# Patient Record
Sex: FEMALE | Race: WHITE | NOT HISPANIC OR LATINO | Employment: UNEMPLOYED | URBAN - METROPOLITAN AREA
[De-identification: names, ages, dates, MRNs, and addresses within clinical notes are randomized per-mention and may not be internally consistent; named-entity substitution may affect disease eponyms.]

---

## 2017-07-18 ENCOUNTER — PRE VISIT (OUTPATIENT)
Dept: ORTHOPEDICS | Facility: CLINIC | Age: 57
End: 2017-07-18

## 2017-07-18 NOTE — TELEPHONE ENCOUNTER
Pt returned my call, She has xrays that we need to gather.  Emailed TETO form to pt ([email or web address])

## 2017-07-18 NOTE — TELEPHONE ENCOUNTER
1.  Date/reason for appt: 7/25/17 3:20PM Scoliosis, Per pt, Xrays   2.  Referring provider: Self   3.  Call to patient (Yes / No - short description): Yes, called and LM for pt to return my call regarding outside recs. -1st attempt   4.  Previous care at / records requested from:  [facility] - Who?

## 2017-07-24 ENCOUNTER — TRANSFERRED RECORDS (OUTPATIENT)
Dept: HEALTH INFORMATION MANAGEMENT | Facility: CLINIC | Age: 57
End: 2017-07-24

## 2017-07-24 NOTE — TELEPHONE ENCOUNTER
Called and spoke with pt to inform her we do not have her recs from [facility]. Pt understood and will call the Clinic to see if she can expedite her recs to be faxed. 2nd attempt

## 2017-07-24 NOTE — TELEPHONE ENCOUNTER
Called [facility], spoke with Sandhya who mentioned that she is waiting for the MD to review the recs and will fax over recs once approved by the MD.

## 2017-07-24 NOTE — TELEPHONE ENCOUNTER
[facility], They will be faxing recs today & pt will be hand carrying her CD (Xrays) to the appt.

## 2017-07-25 ENCOUNTER — OFFICE VISIT (OUTPATIENT)
Dept: ORTHOPEDICS | Facility: CLINIC | Age: 57
End: 2017-07-25

## 2017-07-25 VITALS — BODY MASS INDEX: 26.13 KG/M2 | HEIGHT: 62 IN | WEIGHT: 142 LBS

## 2017-07-25 DIAGNOSIS — M41.25 OTHER IDIOPATHIC SCOLIOSIS, THORACOLUMBAR REGION: Primary | ICD-10-CM

## 2017-07-25 DIAGNOSIS — M62.830 PARASPINAL MUSCLE SPASM: ICD-10-CM

## 2017-07-25 ASSESSMENT — ENCOUNTER SYMPTOMS
NECK PAIN: 1
BACK PAIN: 1
ARTHRALGIAS: 0
MUSCLE WEAKNESS: 0
MYALGIAS: 0
MUSCLE CRAMPS: 0
JOINT SWELLING: 0
STIFFNESS: 1

## 2017-07-25 NOTE — MR AVS SNAPSHOT
After Visit Summary   7/25/2017    Latricia Epperson    MRN: [id number]           Patient Information     Date Of Birth          1960        Visit Information        Provider Department      7/25/2017 3:20 PM Ja Kinney MD [facility]        Today's Diagnoses     Other idiopathic scoliosis, thoracolumbar region    -  1       Follow-ups after your visit        Additional Services     PHYSICAL THERAPY REFERRAL (External-Prints)       Physical Therapy Referral            SPINE SURGERY REFERRAL       Dr Caraballo or Jayleen                  Your next 10 appointments already scheduled     Nov 09, 2017  1:00 PM CST   (Arrive by 12:30 PM)   NEW SPINE with Prateek Clemens MD   [facility])    [address] [address] [phone]              Who to contact     Please call your clinic at [phone] to:    Ask questions about your health    Make or cancel appointments    Discuss your medicines    Learn about your test results    Speak to your doctor   If you have compliments or concerns about an experience at your clinic, or if you wish to file a complaint, please contact [facility] Patient Relations at [phone] or email us at [email or web address]         Additional Information About Your Visit        MyChart Information     [employer] is an electronic gateway that provides easy, online access to your medical records. With [employer], you can request a clinic appointment, read your test results, renew a prescription or communicate with your care team.     To sign up for [employer] visit the website at [email or web address]   You will be asked to enter the access code listed below, as well as some personal information. Please follow the directions to create your username and password.     Your access code is: [email or web address]  Expires: 10/9/2017  6:31  "AM     Your access code will  in 90 days. If you need help or a new code, please contact your [facility] or call [phone] for assistance.        Care EveryWhere ID     This is your Care EveryWhere ID. This could be used by other organizations to access your [place] medical records  [id number]        Your Vitals Were     Height BMI (Body Mass Index)                1.575 m (5' 2\") 25.97 kg/m2           Blood Pressure from Last 3 Encounters:   16 107/73    Weight from Last 3 Encounters:   17 64.4 kg (142 lb)   16 62.1 kg (137 lb)              We Performed the Following     PHYSICAL THERAPY REFERRAL (External-Prints)     SPINE SURGERY REFERRAL        Primary Care Provider Office Phone # Fax #    Mya MAN Mynor, MAURICIO [phone]       PRIMARY CARE CENTER [address] [place] [address] [phone]  [facility] [address]        Equal Access to Services     LESLIE DUNCAN : Hadii aad ku hadasho Soomaali, waaxda luqadaha, qaybta kaalmada adeegyada, waxay idiin hayminorn chelsey barger . So [facility] [phone].    ATENCIÓN: Si habla español, tiene a klein disposición servicios gratuitos de asistencia lingüística. Llame al [phone].    We comply with applicable federal civil rights laws and [place] laws. We do not discriminate on the basis of race, color, national origin, age, disability sex, sexual orientation or gender identity.            Thank you!     Thank you for choosing [facility]  for your care. Our goal is always to provide you with excellent care. Hearing back from our patients is one way we can continue to improve our services. Please take a few minutes to complete the written survey that you may receive in the mail after your visit with us. Thank you!             Your Updated Medication List - Protect others around you: Learn how to safely use, store and throw away your medicines at www.disposemymeds.org.          This list is " accurate as of: 7/25/17  4:56 PM.  Always use your most recent med list.                   Brand Name Dispense Instructions for use Diagnosis    MULTI-VITAMIN DAILY PO           OMEGA-3 FISH OIL PO

## 2017-07-25 NOTE — NURSING NOTE
"Reason For Visit:   Chief Complaint   Patient presents with     Musculoskeletal Problem     Scoliosis, previously treated [facility]     Age: 57 year old    Occupation: Computer work/sitting/phone.  Currently working? Yes.    Date of injury: gradual increase in symptoms over last 5 years    Date of surgery: No surgical history to back    Smoker: Yes - very intermittent    Pain Assessment  Patient Currently in Pain: Yes  Primary Pain Location: Back  Pain Descriptors: Pressure, Dull  Aggravating Factors: Standing, Walking for too long    Ht 1.575 m (5' 2\")  Wt 64.4 kg (142 lb)  BMI 25.97 kg/m2                                                   "

## 2017-07-25 NOTE — LETTER
"7/25/2017       RE: Latricia Epperson  [id number] [address] [address]     Dear Colleague,    Thank you for referring your patient, Latricia Epperson, to the [facility] at [facility]. Please see a copy of my visit note below.    HISTORY OF PRESENT ILLNESS  Ms. Epperson is a pleasant 57 year old year old female who presents to clinic today with history of scoliosis  Latricia explains that she 'decided a long time ago not to have surgery for her back and it has started to become more prominent in the 'curve'  Location: thoracic and lumbar spine  Quality:  achy pain    Severity: 2/10 at worst    Duration: many years  No radiation of pain into legs or buttocks, has paraspinal muscle spasms at times.    Additional history: as documented    MEDICAL HISTORY  Patient Active Problem List   Diagnosis     History of colonic polyps, due for colonoscopy      Current Outpatient Prescriptions   Medication Sig Dispense Refill     Omega-3 Fatty Acids (OMEGA-3 FISH OIL PO)        Multiple Vitamin (MULTI-VITAMIN DAILY PO)      No Known Allergies    Family History   Problem Relation Age of Onset     Depression Mother      Hyperlipidemia Brother      Thyroid Disease Sister      Depression Sister      OSTEOPOROSIS Paternal Grandmother      Coronary Artery Disease Paternal Grandfather      Cirrhosis Father      Osteoarthritis       Bipolar Disorder Mother      Additional medical/Social/Surgical histories reviewed in [facility] and updated as appropriate.     REVIEW OF SYSTEMS (7/25/2017)  10 point ROS of systems including Constitutional, Eyes, Respiratory, Cardiovascular, Gastroenterology, Genitourinary, Integumentary, Musculoskeletal, Psychiatric were all negative except for pertinent positives noted in my HPI.     PHYSICAL EXAM  Vitals:    07/25/17 1545   Weight: 64.4 kg (142 lb)   Height: 1.575 m (5' 2\")     Vital Signs: Ht 1.575 m (5' 2\")  Wt 64.4 kg (142 lb)  BMI 25.97 kg/m2 Patient declined " being weighed. Body mass index is 25.97 kg/(m^2).    General  - normal appearance, in no obvious distress  CV  - normal peripheral perfusion  Pulm  - normal respiratory pattern, non-labored  Musculoskeletal - thoraco/lumbar spine  - stance: normal gait without limp, no obvious leg length discrepancy, normal heel and toe walk  - inspection: abnormal bone and joint alignment, has obvious scoliosis- thoracolumbar  - palpation: no paravertebral or bony tenderness  - ROM: flexion exacerbates mild pain in thoracic spine, normal extension, sidebending, rotation  - strength: lower extremities 5/5 in all planes  - special tests:  (-) straight leg raise  (-) slump test  Neuro  - patellar and Achilles DTRs 2+ bilaterally, NO lower extremity sensory deficit throughout L5 distribution, grossly normal coordination, normal muscle tone  Skin  - no ecchymosis, erythema, warmth, or induration, no obvious rash  Psych  - interactive, appropriate, normal mood and affect    ASSESSMENT & PLAN  58 yo female with history of scoliosis, stable  -discussed HEP for exercises and utility of physical therapy  She would like to have a conversation with Dr Caraballo about possible surgical correction of her spine.   She doesn't like to curve and 'thought that she would always have to live with it'  We discussed her spine xrays and that currently she is functional, but her back 'bothers her daily'  F/u after Dr Caraballo as needed    Ja Kinney MD, CAM

## 2017-07-25 NOTE — TELEPHONE ENCOUNTER
Records received from [facility].   Included  Office notes: 2014  Other: neurospinal function index reports

## 2017-07-25 NOTE — PROGRESS NOTES
"HISTORY OF PRESENT ILLNESS  Ms. Epperson is a pleasant 57 year old year old female who presents to clinic today with history of scoliosis  Latricia explains that she 'decided a long time ago not to have surgery for her back and it has started to become more prominent in the 'curve'  Location: thoracic and lumbar spine  Quality:  achy pain    Severity: 2/10 at worst    Duration: many years  No radiation of pain into legs or buttocks, has paraspinal muscle spasms at times.    Additional history: as documented    MEDICAL HISTORY  Patient Active Problem List   Diagnosis     History of colonic polyps, due for colonoscopy        Current Outpatient Prescriptions   Medication Sig Dispense Refill     Omega-3 Fatty Acids (OMEGA-3 FISH OIL PO)        Multiple Vitamin (MULTI-VITAMIN DAILY PO)          No Known Allergies    Family History   Problem Relation Age of Onset     Depression Mother      Hyperlipidemia Brother      Thyroid Disease Sister      Depression Sister      OSTEOPOROSIS Paternal Grandmother      Coronary Artery Disease Paternal Grandfather      Cirrhosis Father      Osteoarthritis       Bipolar Disorder Mother        Additional medical/Social/Surgical histories reviewed in [facility] and updated as appropriate.     REVIEW OF SYSTEMS (7/25/2017)  10 point ROS of systems including Constitutional, Eyes, Respiratory, Cardiovascular, Gastroenterology, Genitourinary, Integumentary, Musculoskeletal, Psychiatric were all negative except for pertinent positives noted in my HPI.     PHYSICAL EXAM  Vitals:    07/25/17 1545   Weight: 64.4 kg (142 lb)   Height: 1.575 m (5' 2\")     Vital Signs: Ht 1.575 m (5' 2\")  Wt 64.4 kg (142 lb)  BMI 25.97 kg/m2 Patient declined being weighed. Body mass index is 25.97 kg/(m^2).    General  - normal appearance, in no obvious distress  CV  - normal peripheral perfusion  Pulm  - normal respiratory pattern, non-labored  Musculoskeletal - thoraco/lumbar spine  - stance: normal gait without limp, no " obvious leg length discrepancy, normal heel and toe walk  - inspection: abnormal bone and joint alignment, has obvious scoliosis- thoracolumbar  - palpation: no paravertebral or bony tenderness  - ROM: flexion exacerbates mild pain in thoracic spine, normal extension, sidebending, rotation  - strength: lower extremities 5/5 in all planes  - special tests:  (-) straight leg raise  (-) slump test  Neuro  - patellar and Achilles DTRs 2+ bilaterally, NO lower extremity sensory deficit throughout L5 distribution, grossly normal coordination, normal muscle tone  Skin  - no ecchymosis, erythema, warmth, or induration, no obvious rash  Psych  - interactive, appropriate, normal mood and affect    ASSESSMENT & PLAN  58 yo female with history of scoliosis, stable  -discussed HEP for exercises and utility of physical therapy  She would like to have a conversation with Dr Caraballo about possible surgical correction of her spine.   She doesn't like to curve and 'thought that she would always have to live with it'  We discussed her spine xrays and that currently she is functional, but her back 'bothers her daily'  F/u after Dr Caraballo as needed    Ja Kinney MD, CAQSM    Answers for HPI/ROS submitted by the patient on 7/25/2017   General Symptoms: No  Skin Symptoms: No  HENT Symptoms: No  EYE SYMPTOMS: No  HEART SYMPTOMS: No  LUNG SYMPTOMS: No  INTESTINAL SYMPTOMS: No  URINARY SYMPTOMS: No  GYNECOLOGIC SYMPTOMS: No  BREAST SYMPTOMS: No  SKELETAL SYMPTOMS: Yes  BLOOD SYMPTOMS: No  NERVOUS SYSTEM SYMPTOMS: No  MENTAL HEALTH SYMPTOMS: No  Back pain: Yes  Muscle aches: No  Neck pain: Yes  Swollen joints: No  Joint pain: No  Bone pain: Yes  Muscle cramps: No  Muscle weakness: No  Joint stiffness: Yes  Bone fracture: No

## 2017-10-13 ENCOUNTER — TRANSFERRED RECORDS (OUTPATIENT)
Dept: HEALTH INFORMATION MANAGEMENT | Facility: CLINIC | Age: 57
End: 2017-10-13

## 2017-11-14 ENCOUNTER — PRE VISIT (OUTPATIENT)
Dept: ORTHOPEDICS | Facility: CLINIC | Age: 57
End: 2017-11-14

## 2017-11-14 NOTE — TELEPHONE ENCOUNTER
APPT INFO    Date /Time: 11/30/17- 9:30 AM   Reason for Appt: Scoliosis    Ref Provider/Clinic: Dr. Kinney    Are there internal records? If yes, list: [facility]    -XR Scoliosis 7/25/17    Patient Contact (Y/N) & Call Details: No - Pt is referred. Previous Chiropractic records were already forwarded to Ortho clinic for Dr. Kinney appointment. (See Previsit on 7/18/17.)    Action: Closing encounter.

## 2017-11-28 DIAGNOSIS — M41.9 SCOLIOSIS: Primary | ICD-10-CM

## 2017-11-30 ENCOUNTER — OFFICE VISIT (OUTPATIENT)
Dept: ORTHOPEDICS | Facility: CLINIC | Age: 57
End: 2017-11-30

## 2017-11-30 VITALS — HEIGHT: 62 IN | BODY MASS INDEX: 25.62 KG/M2 | WEIGHT: 139.2 LBS

## 2017-11-30 DIAGNOSIS — M41.125 ADOLESCENT IDIOPATHIC SCOLIOSIS OF THORACOLUMBAR REGION: Primary | ICD-10-CM

## 2017-11-30 RX ORDER — CHOLECALCIFEROL (VITAMIN D3) 25 MCG
1 CAPSULE ORAL EVERY OTHER DAY
COMMUNITY
End: 2022-01-12

## 2017-11-30 NOTE — PROGRESS NOTES
Spine Surgery Consultation    REFERRING PHYSICIAN: Ja Kinney   PRIMARY CARE PHYSICIAN: Mya Lowe           Chief Complaint:   Eval/Assessment (scoliosis, low to mid back pain)      History of Present Illness:  Symptom Profile Including: location of symptoms, onset, severity, exacerbating/alleviating factors, previous treatments:        Latricia Epperson is a 57 year old female with a history of scoliosis treated with observation without pain or discomfort until about 2000 following birth of her 3rd child and has noticed increased discomfort in the past 3 years. She has been sitting more throughout the day at her work which she attributes to the increase in pain. She has been going to PT for the past month or two and has noticed improvement in pain and discomfort. She also walks a lot which has now begun to bother her this past year.     SRS: 66%  VAS 2/10 back, 0/10 leg  PROMIS  -mental 16  -physical 13         Past Medical History:     Past Medical History:   Diagnosis Date     Anemia      Fibroids      Radon exposure      Scoliosis      Scoliosis of thoracic spine, unspecified scoliosis             Past Surgical History:     Past Surgical History:   Procedure Laterality Date     HYSTERECTOMY  2007    fibroids     TONSILLECTOMY              Social History:     Social History   Substance Use Topics     Smoking status: Current Some Day Smoker     Smokeless tobacco: Never Used      Comment: occ smoker     Alcohol use Not on file      Comment: 3 drinks a month            Family History:     Family History   Problem Relation Age of Onset     Depression Mother      Hyperlipidemia Brother      Thyroid Disease Sister      Depression Sister      OSTEOPOROSIS Paternal Grandmother      Coronary Artery Disease Paternal Grandfather      Cirrhosis Father      Osteoarthritis       Bipolar Disorder Mother             Allergies:   No Known Allergies         Medications:     Current Outpatient Prescriptions  "  Medication     TURMERIC CURCUMIN PO     Cholecalciferol (VITAMIN D-3) 1000 UNITS CAPS     Calcium-Vitamin D-Vitamin K (CALCIUM + D + K PO)     Omega-3 Fatty Acids (OMEGA-3 FISH OIL PO)     Multiple Vitamin (MULTI-VITAMIN DAILY PO)     No current facility-administered medications for this visit.              Review of Systems:     A 10 point ROS was performed and reviewed. Specific responses to these questions are noted at the end of the document.         Physical Exam:   Vitals: Ht 1.575 m (5' 2.01\")  Wt 63.1 kg (139 lb 3.2 oz)  BMI 25.45 kg/m2  Constitutional: awake, alert, cooperative, no apparent distress, appears stated age.    Eyes: The sclera are white.  Ears, Nose, Throat: The trachea is midline.  Psychiatric: The patient has a normal affect.  Respiratory: breathing non-labored  Cardiovascular: The extremities are warm and perfused.  Skin: no obvious rashes or lesions.  Musculoskeletal, Neurologic, and Spine:   Non-antalgic gait without use of assistive devices.      Thoracic Spine:    Appearance - scoliosis    Palpation - non tender    Strength/ROM - full         Lumbar Spine:    Appearance - scoliosis    Palpation - non tender    ROM - full    Motor -     L2-3: Hip flexion L and R 5/5 strength          L4:  Knee extension L and R 5/5 strength         L5:  Foot / EHL dorsiflexion L and R 5/5 strength         S1:  Plantarflexion  L and R 5/5 strength    Sensation: intact to light touch L3-S1 distribution BLE      Special tests -     Straight leg raise - Negative     ENRRIQUE - Negative     Pain with hip ROM - Negative     Facet loading - Negative           Imaging:   We ordered and independently reviewed new radiographs at this clinic visit. The results were discussed with the patient.  Findings include:    T9-L4 60 degrees convex left    Pelvic Incidence: 63  Pelvic Tilt: 53  Lumbar Lordosis (M0e-P3r): 48  PI-LL mismatch: 15  K7e-L9t: 42  ThoracoLumbar (V49e-H8k): 23  Thoracic (H8u-F82m): 14             " Assessment and Plan:   Assessment:  57 year old female with history of thoracolumbar scoliosis diagnosed in her teens and treated conservatively with increasing lumbar discomfort over the past several years, controlled with conservative measures. Curious about surgical intervention, associated risks and any other treatment modalities.      Plan:  We had a long discussion today with the patient regarding the possible surgical interventions for her scoliosis. We also discussed the risks benefits alternatives and recovery. At this time patient would like to continue conservative therapies. RTC ~2 years for repeat xrays and assessment of any curve progression.      JOSEPH Falk MD  PGY-4 Orthopaedic Surgery    Seen and discussed with Dr. Caraballo who is in agreement with the above assessment and plan      Total Time = >25 min, 50% of which was spent in counseling and coordination of care as documented above.    I saw and evaluated the patient on the day of the visit and I formulated the plan.  Ja Caraballo MD      Answers for HPI/ROS submitted by the patient on 11/30/2017   General Symptoms: No  Skin Symptoms: No  HENT Symptoms: No  EYE SYMPTOMS: No  HEART SYMPTOMS: No  LUNG SYMPTOMS: No  INTESTINAL SYMPTOMS: No  URINARY SYMPTOMS: No  GYNECOLOGIC SYMPTOMS: No  BREAST SYMPTOMS: No  SKELETAL SYMPTOMS: No  BLOOD SYMPTOMS: No  NERVOUS SYSTEM SYMPTOMS: No  MENTAL HEALTH SYMPTOMS: No

## 2017-11-30 NOTE — MR AVS SNAPSHOT
After Visit Summary   11/30/2017    Latricia Epperson    MRN: [id number]           Patient Information     Date Of Birth          1960        Visit Information        Provider Department      11/30/2017 9:30 AM Ja Caraballo MD [facility]        Today's Diagnoses     Adolescent idiopathic scoliosis of thoracolumbar region    -  1       Follow-ups after your visit        Additional Services     ORTHOTICS REFERRAL (external)       Orthotics: Spinal Brace:  [facility] Brace.   Pt. Will arrange to have this done in [facility] through [facility] in [facility]#[phone] fax# [phone]      Please be aware that coverage of these services is subject to the terms and limitations of your health insurance plan.  Call member services at your health plan with any benefit or coverage questions.      Please bring the following to your appointment:    >>   Any x-rays, CTs or MRIs which have been performed.  Contact the facility where they were done to arrange for  prior to your scheduled appointment.    >>   List of current medications   >>   This referral request   >>   Any documents/labs given to you for this referral                  Who to contact     Please call your clinic at [phone] to:    Ask questions about your health    Make or cancel appointments    Discuss your medicines    Learn about your test results    Speak to your doctor   If you have compliments or concerns about an experience at your clinic, or if you wish to file a complaint, please contact [facility] Patient Relations at [phone] or email us at [email or web address]         Additional Information About Your Visit        MyChart Information     [employer] gives you secure access to your electronic health record. If you see a primary care provider, you can also send messages to your care team and make appointments. If you have questions, please call your primary  "care clinic.  If you do not have a primary care provider, please call [phone] and they will assist you.      Bring Light is an electronic gateway that provides easy, online access to your medical records. With Bring Light, you can request a clinic appointment, read your test results, renew a prescription or communicate with your care team.     To access your existing account, please contact your [facility] or call [phone] for assistance.        Care EveryWhere ID     This is your Care EveryWhere ID. This could be used by other organizations to access your [facility] medical records  [id number]        Your Vitals Were     Height BMI (Body Mass Index)                1.575 m (5' 2.01\") 25.45 kg/m2           Blood Pressure from Last 3 Encounters:   No data found for BP    Weight from Last 3 Encounters:   No data found for Wt              Today, you had the following     No orders found for display       Primary Care Provider Office Phone # Fax #    Mya Lowe, APRN -[phone]       [address] [place] [address]  [facility] [address]        Equal Access to Services     [facility] : Hadii aad ku hadasho Soomaali, waaxda luqadaha, qaybta kaalmada adeegyada, octavia barger . So [facility] [phone].    ATENCIÓN: Si habla español, tiene a klein disposición servicios gratuitos de asistencia lingüística. [facility] [phone].    We comply with applicable federal civil rights laws and [place] laws. We do not discriminate on the basis of race, color, national origin, age, disability, sex, sexual orientation, or gender identity.            Thank you!     Thank you for choosing [facility]  for your care. Our goal is always to provide you with excellent care. Hearing back from our patients is one way we can continue to improve our services. Please take a few minutes to complete the written survey that you may receive in the mail after " your visit with us. Thank you!             Your Updated Medication List - Protect others around you: Learn how to safely use, store and throw away your medicines at www.disposemymeds.org.          This list is accurate as of: 11/30/17 11:59 PM.  Always use your most recent med list.                   Brand Name Dispense Instructions for use Diagnosis    CALCIUM + D + K PO      Take 1 tablet by mouth daily        MULTI-VITAMIN DAILY PO           OMEGA-3 FISH OIL PO           TURMERIC CURCUMIN PO      Take 1 tablet by mouth every other day        Vitamin D-3 1000 UNITS Caps      Take 1 tablet by mouth every other day

## 2017-11-30 NOTE — LETTER
11/30/2017       RE: Latricia Epperson  [id number] [facility] [address]     Dear Colleague,    Thank you for referring your patient, Latricia Epperson, to the [facility] at [facility]. Please see a copy of my visit note below.    Spine Surgery Consultation    REFERRING PHYSICIAN: Ja Kinney   PRIMARY CARE PHYSICIAN: Mya Lowe           Chief Complaint:   Eval/Assessment (scoliosis, low to mid back pain)      History of Present Illness:  Symptom Profile Including: location of symptoms, onset, severity, exacerbating/alleviating factors, previous treatments:        Latricia Epperson is a 57 year old female with a history of scoliosis treated with observation without pain or discomfort until about 2000 following birth of her 3rd child and has noticed increased discomfort in the past 3 years. She has been sitting more throughout the day at her work which she attributes to the increase in pain. She has been going to PT for the past month or two and has noticed improvement in pain and discomfort. She also walks a lot which has now begun to bother her this past year.     SRS: 66%  VAS 2/10 back, 0/10 leg  PROMIS  -mental 16  -physical 13         Past Medical History:     Past Medical History:   Diagnosis Date     Anemia      Fibroids      Radon exposure      Scoliosis      Scoliosis of thoracic spine, unspecified scoliosis             Past Surgical History:     Past Surgical History:   Procedure Laterality Date     HYSTERECTOMY  2007    fibroids     TONSILLECTOMY              Social History:     Social History   Substance Use Topics     Smoking status: Current Some Day Smoker     Smokeless tobacco: Never Used      Comment: occ smoker     Alcohol use Not on file      Comment: 3 drinks a month            Family History:     Family History   Problem Relation Age of Onset     Depression Mother      Hyperlipidemia Brother      Thyroid Disease Sister      Depression  "Sister      OSTEOPOROSIS Paternal Grandmother      Coronary Artery Disease Paternal Grandfather      Cirrhosis Father      Osteoarthritis       Bipolar Disorder Mother             Allergies:   No Known Allergies         Medications:     Current Outpatient Prescriptions   Medication     TURMERIC CURCUMIN PO     Cholecalciferol (VITAMIN D-3) 1000 UNITS CAPS     Calcium-Vitamin D-Vitamin K (CALCIUM + D + K PO)     Omega-3 Fatty Acids (OMEGA-3 FISH OIL PO)     Multiple Vitamin (MULTI-VITAMIN DAILY PO)     No current facility-administered medications for this visit.              Review of Systems:     A 10 point ROS was performed and reviewed. Specific responses to these questions are noted at the end of the document.         Physical Exam:   Vitals: Ht 1.575 m (5' 2.01\")  Wt 63.1 kg (139 lb 3.2 oz)  BMI 25.45 kg/m2  Constitutional: awake, alert, cooperative, no apparent distress, appears stated age.    Eyes: The sclera are white.  Ears, Nose, Throat: The trachea is midline.  Psychiatric: The patient has a normal affect.  Respiratory: breathing non-labored  Cardiovascular: The extremities are warm and perfused.  Skin: no obvious rashes or lesions.  Musculoskeletal, Neurologic, and Spine:   Non-antalgic gait without use of assistive devices.      Thoracic Spine:    Appearance - scoliosis    Palpation - non tender    Strength/ROM - full         Lumbar Spine:    Appearance - scoliosis    Palpation - non tender    ROM - full    Motor -     L2-3: Hip flexion L and R 5/5 strength          L4:  Knee extension L and R 5/5 strength         L5:  Foot / EHL dorsiflexion L and R 5/5 strength         S1:  Plantarflexion  L and R 5/5 strength    Sensation: intact to light touch L3-S1 distribution BLE      Special tests -     Straight leg raise - Negative     ENRRIQUE - Negative     Pain with hip ROM - Negative     Facet loading - Negative           Imaging:   We ordered and independently reviewed new radiographs at this clinic visit. " The results were discussed with the patient.  Findings include:    T9-L4 60 degrees convex left    Pelvic Incidence: 63  Pelvic Tilt: 53  Lumbar Lordosis (L5v-J6x): 48  PI-LL mismatch: 15  C8k-O9y: 42  ThoracoLumbar (G88v-Q7m): 23  Thoracic (A5j-W64n): 14             Assessment and Plan:   Assessment:  57 year old female with history of thoracolumbar scoliosis diagnosed in her teens and treated conservatively with increasing lumbar discomfort over the past several years, controlled with conservative measures. Curious about surgical intervention, associated risks and any other treatment modalities.      Plan:  We had a long discussion today with the patient regarding the possible surgical interventions for her scoliosis. We also discussed the risks benefits alternatives and recovery. At this time patient would like to continue conservative therapies. RTC ~2 years for repeat xrays and assessment of any curve progression.      JOSEPH Falk MD  PGY-4 Orthopaedic Surgery    Seen and discussed with Dr. Caraballo who is in agreement with the above assessment and plan      Total Time = >25 min, 50% of which was spent in counseling and coordination of care as documented above.      Answers for HPI/ROS submitted by the patient on 11/30/2017   General Symptoms: No  Skin Symptoms: No  HENT Symptoms: No  EYE SYMPTOMS: No  HEART SYMPTOMS: No  LUNG SYMPTOMS: No  INTESTINAL SYMPTOMS: No  URINARY SYMPTOMS: No  GYNECOLOGIC SYMPTOMS: No  BREAST SYMPTOMS: No  SKELETAL SYMPTOMS: No  BLOOD SYMPTOMS: No  NERVOUS SYSTEM SYMPTOMS: No  MENTAL HEALTH SYMPTOMS: No      Again, thank you for allowing me to participate in the care of your patient.      Sincerely,    Ja Caraballo MD

## 2017-11-30 NOTE — NURSING NOTE
"Reason For Visit:   Chief Complaint   Patient presents with     Eval/Assessment     scoliosis, low to mid back pain                  Primary MD: Mya Lowe  Ref. MD: Dr. Kinney      Occupation .  Currently working? Yes.  Work status?  Full time.  Date of injury: none    Date of surgery: none    Smoker: Yes, socially, not every day      Ht 1.575 m (5' 2.01\")  Wt 63.1 kg (139 lb 3.2 oz)  BMI 25.45 kg/m2    Pain Assessment  Patient Currently in Pain: Yes  0-10 Pain Scale:  (1 - 7)  Primary Pain Location: Back  Pain Orientation: Lower, Mid  Pain Descriptors: Dull  Alleviating Factors: Other (comment) (repositioning)  Aggravating Factors: Other (comment) (any prolonged position)        Visual Analog Pain Scale  Back Pain Scale 0-10: 2  Right leg pain: 0  Left leg pain: 0    Promis 10 Assessment    PROMIS 10 11/30/2017   In general, would you say your health is: Very good   In general, would you say your quality of life is: Very good   In general, how would you rate your physical health? Good   In general, how would you rate your mental health, including your mood and your ability to think? Very good   In general, how would you rate your satisfaction with your social activities and relationships? Very good   In general, please rate how well you carry out your usual social activities and roles Good   To what extent are you able to carry out your everyday physical activities such as walking, climbing stairs, carrying groceries, or moving a chair? Moderately   How often have you been bothered by emotional problems such as feeling anxious, depressed or irritable? Rarely   How would you rate your fatigue on average? Mild   How would you rate your pain on average?   0 = No Pain  to  10 = Worst Imaginable Pain 4   In general, would you say your health is: 4   In general, would you say your quality of life is: 4   In general, how would you rate your physical health? 3   In general, how would you rate " your mental health, including your mood and your ability to think? 4   In general, how would you rate your satisfaction with your social activities and relationships? 4   In general, please rate how well you carry out your usual social activities and roles. (This includes activities at home, at work and in your community, and responsibilities as a parent, child, spouse, employee, friend, etc.) 3   To what extent are you able to carry out your everyday physical activities such as walking, climbing stairs, carrying groceries, or moving a chair? 3   In the past 7 days, how often have you been bothered by emotional problems such as feeling anxious, depressed, or irritable? 2   In the past 7 days, how would you rate your fatigue on average? 2   In the past 7 days, how would you rate your pain on average, where 0 means no pain, and 10 means worst imaginable pain? 4   Global Mental Health Score 16   Global Physical Health Score 13   PROMIS TOTAL - SUBSCORES 29                Sarahy Ochoa LPN

## 2017-12-31 ENCOUNTER — HEALTH MAINTENANCE LETTER (OUTPATIENT)
Age: 57
End: 2017-12-31

## 2018-10-02 ENCOUNTER — MYC MEDICAL ADVICE (OUTPATIENT)
Dept: ORTHOPEDICS | Facility: CLINIC | Age: 58
End: 2018-10-02

## 2018-11-27 ENCOUNTER — PATIENT OUTREACH (OUTPATIENT)
Dept: CARE COORDINATION | Facility: CLINIC | Age: 58
End: 2018-11-27

## 2018-11-28 ASSESSMENT — ENCOUNTER SYMPTOMS
ARTHRALGIAS: 0
MYALGIAS: 0
BACK PAIN: 1
MUSCLE CRAMPS: 0
NECK PAIN: 0
STIFFNESS: 0
JOINT SWELLING: 0
MUSCLE WEAKNESS: 0

## 2018-11-29 ENCOUNTER — OFFICE VISIT (OUTPATIENT)
Dept: INTERNAL MEDICINE | Facility: CLINIC | Age: 58
End: 2018-11-29
Payer: COMMERCIAL

## 2018-11-29 VITALS
OXYGEN SATURATION: 100 % | BODY MASS INDEX: 24.7 KG/M2 | SYSTOLIC BLOOD PRESSURE: 114 MMHG | WEIGHT: 139.4 LBS | HEART RATE: 75 BPM | DIASTOLIC BLOOD PRESSURE: 65 MMHG | HEIGHT: 63 IN

## 2018-11-29 DIAGNOSIS — Z13.1 SCREENING FOR DIABETES MELLITUS: ICD-10-CM

## 2018-11-29 DIAGNOSIS — Z13.89 SCREENING FOR HEMATURIA OR PROTEINURIA: ICD-10-CM

## 2018-11-29 DIAGNOSIS — Z13.0 SCREENING FOR DEFICIENCY ANEMIA: ICD-10-CM

## 2018-11-29 DIAGNOSIS — Z13.220 SCREENING FOR HYPERLIPIDEMIA: ICD-10-CM

## 2018-11-29 DIAGNOSIS — R01.1 MURMUR, CARDIAC: Primary | ICD-10-CM

## 2018-11-29 DIAGNOSIS — Z12.31 ENCOUNTER FOR SCREENING MAMMOGRAM FOR BREAST CANCER: ICD-10-CM

## 2018-11-29 NOTE — PATIENT INSTRUCTIONS
Primary Care Center Phone Number: [phone]   Primary Care Center Medication Refill Request Information:  * Please contact your pharmacy regarding ANY request for medication refills.  ** [facility] Prescription Fax = [phone]  * Please allow 3 business days for routine medication refills.  * Please allow 5 business days for controlled substance medication refills.     Primary [facility] Test Result notification information:  *You will be notified with in 7-10 days of your appointment day regarding the results of your test.  If you are on MyChart you will be notified as soon as the provider has reviewed the results and signed off on them.

## 2018-11-29 NOTE — PROGRESS NOTES
[facility]  Primary Care Center   Mya Lowe, MAURICIO CNP  11/29/2018      Chief Complaint:   Preventive Physical       History of Present Illness:   Latricia Epperson is a 58 year old female with a history of scoliosis and fibroids who presents for a routine physical exam. Latricia does not need a pap smear, as she had a hysterectomy in 2007. However, she does still have ovaries, and would like a pelvic exam. She denies pelvic pain or concern about STD exposure.     Latricia's eye exams are up to date as of 3 weeks ago. She adds that she was given a new prescription, which she is in the process of getting accustomed to. Latricia's dental exams are also up to date.     Latricia is due for a routine mammogram and some routine bloodwork.      She had a colonoscopy 2 years ago and had 2 pre-cancerous polyps resected.  Due for colonoscopy next year.   Hx hysterectomy: hx of past anemia  due to uterine fibroids and menorrhagia.    Other concerns discussed:at [facility]   Latricia reports that she has been following with a PT  every 6 months in [facility],who was trained in the [employer] Method.  Latricia reviews her home exercise routine with her PT and has a re-evalution and completes additional exercises at home. She adds that she is confident in her PT's abilities and recommends this provider to others as well.     Latricia reports that she follows with a dermatologist and is concerned that she may be at risk for skin cancer.     Latricia declines the flu vaccine today.      Review of Systems:   Pertinent items are noted in HPI, remainder of complete ROS is negative.      Active Medications:   Current Outpatient Prescriptions:      Calcium-Vitamin D-Vitamin K (CALCIUM + D + K PO), Take 1 tablet by mouth daily, Disp: , Rfl:      Cholecalciferol (VITAMIN D-3) 1000 UNITS CAPS, Take 1 tablet by mouth every other day, Disp: , Rfl:      Multiple Vitamin (MULTI-VITAMIN DAILY PO), , Disp: , Rfl:      Omega-3 Fatty Acids (OMEGA-3  "FISH OIL PO), , Disp: , Rfl:      TURMERIC CURCUMIN PO, Take 1 tablet by mouth every other day, Disp: , Rfl:       Allergies:   Review of patient's allergies indicates no known allergies.      Past Medical History:  Anemia  Scoliosis of thoracic spine, unspecified scoliosis  Colonic polyps  Fibroids  Radon exposure     Past Surgical History:  Hysterectomy, 2007  Tonsillectomy    Family History:   Mother: Depression, bipolar disorder, rheumatoid arthritis, fibroids  Father: Cirrhosis, osteoarthritis  Sister: Thyroid disease, depression  Brother: Hyperlipidemia  Paternal grandmother: Osteoporosis  Paternal grandfather: Coronary artery disease  Maternal aunts (x4): Rheumatoid arthritis, fibroids     Social History:   Marital Status:   Presents to the clinic alone  Tobacco Use: Current some day smoker, never used smokeless tobacco  Alcohol Use: \"3 drinks a month\"  PCP: Mya Lowe     Physical Exam:   /65  Pulse 75  Ht 1.588 m (5' 2.5\")  Wt 63.2 kg (139 lb 6.4 oz)  SpO2 100%  BMI 25.09 kg/m2   Wt Readings from Last 1 Encounters:   11/29/18 63.2 kg (139 lb 6.4 oz)     Constitutional: no distress, comfortable, pleasant   Eyes: anicteric,conjunctiva pink, PERRLA  Ears, Nose and Throat: tympanic membranes clear,  throat clear.   Neck: supple with full range of motion, no thyromegaly.   Cardiovascular: regular rate and rhythm, third heart sound thought to represent a summation gallop heard at the apex. Peripheral pulses full and symmetric.   Respiratory: clear to auscultation, no wheezes or crackles, normal breath sounds   Gastrointestinal: positive bowel sounds, nontender, no hepatosplenomegaly, no masses   Gentiourinary/pelvic: normal external genitalia,  no enlargement of the [place] or [place] glands, urethra normal, perineum normal and free of lesions, adnexa not palpable.No cervix identified.   Musculoskeletal: full range of motion, no edema. S shaped scoliosis curvature of her thoracic-lumbar " spine.   Skin: no concerning lesions, no jaundice, temp normal   Breast: no lumps, no nipple discharge  Neurological:  normal gait,normal speech, no tremor   Psychological: appropriate mood   LYMPH: no axillary, cervical,  supraclavicular, infraclavicular or inguinal nodes      Assessment and Plan:  Murmur, cardiac  Murmur detected during physical exam today. Latricia will complete an echocardiogram on the same day as her routine mammogram.   - Echocardiogram    Encounter for screening mammogram for breast cancer  - Mammogram, routine screening    Screening for deficiency anemia  - CBC with platelets    Screening for hematuria or proteinuria  - UA with Micro reflex to Culture    Screening for hyperlipidemia  - Lipid panel reflex to direct LDL Fasting    Screening for diabetes mellitus  - Comprehensive metabolic panel         Scribe Disclosure:   I, Naomie Dorman, am serving as a scribe to document services personally performed by MAURICIO Mascorro CNP at this visit, based upon the provider's statements to me. All documentation has been reviewed by the aforementioned provider prior to being entered into the official medical record.     Portions of this medical record were completed by a scribe. UPON MY REVIEW AND AUTHENTICATION BY ELECTRONIC SIGNATURE, this confirms (a) I performed the applicable clinical services, and (b) the record is accurate.   Total time spent 40 minutes.  More than 50% of the time spent with Ms. Epperson on counseling / coordinating her care.    Mya MARTÍNEZ CNP

## 2018-11-29 NOTE — NURSING NOTE
Chief Complaint   Patient presents with     Physical     Pt is here for annual physical and pap        Alysa Chakraborty, Karina EMT at 1:51 PM on 11/29/2018

## 2018-11-29 NOTE — MR AVS SNAPSHOT
After Visit Summary   11/29/2018    Latricia Epperson    MRN: [id number]           Patient Information     Date Of Birth          1960        Visit Information        Provider Department      11/29/2018 1:45 PM Mya Lowe APRN [facility]        Today's Diagnoses     Murmur, cardiac    -  1    Encounter for screening mammogram for breast cancer        Screening for deficiency anemia        Screening for hematuria or proteinuria        Screening for hyperlipidemia        Screening for diabetes mellitus          Care Instructions    Primary Care Center Phone Number: [phone]   Primary Care Center Medication Refill Request Information:  * Please contact your pharmacy regarding ANY request for medication refills.  ** [facility] Prescription Fax = [phone]  * Please allow 3 business days for routine medication refills.  * Please allow 5 business days for controlled substance medication refills.     Primary Care Center Test Result notification information:  *You will be notified with in 7-10 days of your appointment day regarding the results of your test.  If you are on MyChart you will be notified as soon as the provider has reviewed the results and signed off on them.                Follow-ups after your visit        Your next 10 appointments already scheduled     Dec 04, 2018 12:00 PM CST   LAB with [facility] ([facility])    [address] [place] [address] [phone]           Please do not eat 10-12 hours before your appointment if you are coming in fasting for labs on lipids, cholesterol, or glucose (sugar). This does not apply to pregnant women. Water, hot tea and black coffee (with nothing added) are okay. Do not drink other fluids, diet soda or chew gum.            Dec 04, 2018  1:00 PM CST   MA SCREENING DIGITAL BILATERAL with [id number]   [facility] ([facility])    [address]  [facility] [address] [phone]           How do I prepare for my exam? (Food and drink instructions) No Food and Drink Restrictions.  How do I prepare for my exam? (Other instructions) Do not use any powder, lotion or deodorant under your arms or on your breast. If you do, we will ask you to remove it before your exam.  What should I wear: Wear comfortable, two-piece clothing.  How long does the exam take: Most scans will take 15 minutes.  What should I bring: Bring any previous mammograms from other facilities or have them mailed to the breast center.  Do I need a :  No  is needed.  What do I need to tell my doctor: If you have any allergies, tell your care team.  What should I do after the exam: No restrictions, You may resume normal activities.  What is this test: This test is an x-ray of the breast to look for breast disease. The breast is pressed between two plates to flatten and spread the tissue. An X-ray is taken of the breast from different angles.  Who should I call with questions: If you have any questions, please call the Imaging Department where you will have your exam. Directions, parking instructions, and other information is available on our website, [email or web address].  Other information about my exam Three-dimensional (3D) mammograms are available at [facility] locations in [facility], [facility], [facility], [place], [facility] and [place].  Health locations include [place] and the [facility] in [place].  Benefits of 3D mammograms include * Improved rate of cancer detection * Decreases your chance of having to go back for more tests, which means fewer: * \"False-positive\" results (This means that there is an abnormal area but it isn't cancer.) * Invasive testing procedures, such as a biopsy or surgery * Can provide clearer images of the breast if you have dense breast tissue.  *3D " "mammography is an optional exam that anyone can have with a 2D mammogram. It doesn't replace or take the place of a 2D mammogram. 2D mammograms remain an effective screening test for all women.  Not all insurance companies cover the cost of a 3D mammogram. Check with your insurance. Three-dimensional (3D) mammograms are available at [facility] locations in [facility], [place], [place], [facility], [place], [place], and [place]. [facility] locations include [facility] in [place]. Benefits of 3D mammograms include: - Improved rate of cancer detection - Decreases your chance of having to go back for more tests, which means fewer: - \"False-positive\" results (This means that there is an abnormal area but it isn't cancer.) - Invasive testing procedures, such as a biopsy or surgery - Can provide clearer images of the breast if you have dense breast tissue. 3D mammography is an optional exam that anyone can have with a 2D mammogram. It doesn't replace or take the place of a 2D mammogram. 2D mammograms remain an effective screening test for all women.  Not all insurance companies cover the cost of a 3D mammogram. Check with your insurance.            Dec 04, 2018  1:30 PM CST   Ech Complete with [address] [place] ([facility])    [address] [place] [address] [phone]           1.  Please bring or wear a comfortable two-piece outfit. 2.  You may eat, drink and take your normal medicines. 3.  For any questions that cannot be answered, please contact the ordering physician 4.  Please do not wear perfumes or scented lotions on the day of your exam.              Future tests that were ordered for you today     Open Future Orders        Priority Expected Expires Ordered    Lipid panel reflex to direct LDL Fasting Routine 11/29/2018 12/13/2018 11/29/2018    CBC with platelets Routine 11/29/2018 12/13/2018 11/29/2018    " "Comprehensive metabolic panel Routine 11/29/2018 12/13/2018 11/29/2018    UA with Micro reflex to Culture Routine 11/29/2018 11/29/2019 11/29/2018    Echocardiogram Routine  11/29/2019 11/29/2018    Mammogram, routine screening Routine  11/29/2019 11/29/2018            Who to contact     Please call your clinic at [phone] to:    Ask questions about your health    Make or cancel appointments    Discuss your medicines    Learn about your test results    Speak to your doctor            Additional Information About Your Visit        Tapestry Information     Tapestry gives you secure access to your electronic health record. If you see a primary care provider, you can also send messages to your care team and make appointments. If you have questions, please call your primary care clinic.  If you do not have a primary care provider, please call [phone] and they will assist you.      Tapestry is an electronic gateway that provides easy, online access to your medical records. With Tapestry, you can request a clinic appointment, read your test results, renew a prescription or communicate with your care team.     To access your existing account, please contact your [facility] or call [phone] for assistance.        Care EveryWhere ID     This is your Care EveryWhere ID. This could be used by other organizations to access your [facility] medical records  [id number]        Your Vitals Were     Pulse Height Pulse Oximetry BMI (Body Mass Index)          75 1.588 m (5' 2.5\") 100% 25.09 kg/m2         Blood Pressure from Last 3 Encounters:   11/29/18 114/65   03/31/16 107/73    Weight from Last 3 Encounters:   11/29/18 63.2 kg (139 lb 6.4 oz)   11/30/17 63.1 kg (139 lb 3.2 oz)   07/25/17 64.4 kg (142 lb)               Primary Care Provider Office Phone # Fax #    MAURICIO Cao [phone]       [address] [place] [address] [address]  [facility] [address]        Equal Access to " Services     [facility]: Hadii aad ku haddonaldzoey Sullivan, waremigioda luqadaha, qaybta kayenisean zimmer, octavia thornton. So [facility] [phone].    ATENCIÓN: Si habla español, tiene a klein disposición servicios gratuitos de asistencia lingüística. Llame al [phone].    We comply with applicable federal civil rights laws and [place] laws. We do not discriminate on the basis of race, color, national origin, age, disability, sex, sexual orientation, or gender identity.            Thank you!     Thank you for choosing [facility]  for your care. Our goal is always to provide you with excellent care. Hearing back from our patients is one way we can continue to improve our services. Please take a few minutes to complete the written survey that you may receive in the mail after your visit with us. Thank you!             Your Updated Medication List - Protect others around you: Learn how to safely use, store and throw away your medicines at www.disposemymeds.org.          This list is accurate as of 11/29/18  2:49 PM.  Always use your most recent med list.                   Brand Name Dispense Instructions for use Diagnosis    CALCIUM + D + K PO      Take 1 tablet by mouth daily        MULTI-VITAMIN DAILY PO           OMEGA-3 FISH OIL PO           TURMERIC CURCUMIN PO      Take 1 tablet by mouth every other day        Vitamin D-3 1000 units Caps      Take 1 tablet by mouth every other day

## 2018-12-04 ENCOUNTER — RADIANT APPOINTMENT (OUTPATIENT)
Dept: MAMMOGRAPHY | Facility: CLINIC | Age: 58
End: 2018-12-04
Attending: NURSE PRACTITIONER
Payer: COMMERCIAL

## 2018-12-04 ENCOUNTER — RADIANT APPOINTMENT (OUTPATIENT)
Dept: CARDIOLOGY | Facility: CLINIC | Age: 58
End: 2018-12-04
Attending: NURSE PRACTITIONER
Payer: COMMERCIAL

## 2018-12-04 DIAGNOSIS — Z13.89 SCREENING FOR HEMATURIA OR PROTEINURIA: ICD-10-CM

## 2018-12-04 DIAGNOSIS — Z13.1 SCREENING FOR DIABETES MELLITUS: ICD-10-CM

## 2018-12-04 DIAGNOSIS — R01.1 MURMUR, CARDIAC: ICD-10-CM

## 2018-12-04 DIAGNOSIS — Z13.0 SCREENING FOR DEFICIENCY ANEMIA: ICD-10-CM

## 2018-12-04 DIAGNOSIS — Z13.220 SCREENING FOR HYPERLIPIDEMIA: ICD-10-CM

## 2018-12-04 LAB
ALBUMIN SERPL-MCNC: 4 G/DL (ref 3.4–5)
ALBUMIN UR-MCNC: NEGATIVE MG/DL
ALP SERPL-CCNC: 93 U/L (ref 40–150)
ALT SERPL W P-5'-P-CCNC: 30 U/L (ref 0–50)
ANION GAP SERPL CALCULATED.3IONS-SCNC: 6 MMOL/L (ref 3–14)
APPEARANCE UR: CLEAR
AST SERPL W P-5'-P-CCNC: 31 U/L (ref 0–45)
BILIRUB SERPL-MCNC: 0.5 MG/DL (ref 0.2–1.3)
BILIRUB UR QL STRIP: NEGATIVE
BUN SERPL-MCNC: 14 MG/DL (ref 7–30)
CALCIUM SERPL-MCNC: 9.1 MG/DL (ref 8.5–10.1)
CHLORIDE SERPL-SCNC: 103 MMOL/L (ref 94–109)
CHOLEST SERPL-MCNC: 218 MG/DL
CO2 SERPL-SCNC: 28 MMOL/L (ref 20–32)
COLOR UR AUTO: NORMAL
CREAT SERPL-MCNC: 0.74 MG/DL (ref 0.52–1.04)
ERYTHROCYTE [DISTWIDTH] IN BLOOD BY AUTOMATED COUNT: 12.3 % (ref 10–15)
GFR SERPL CREATININE-BSD FRML MDRD: 82 ML/MIN/1.7M2
GLUCOSE SERPL-MCNC: 83 MG/DL (ref 70–99)
GLUCOSE UR STRIP-MCNC: NEGATIVE MG/DL
HCT VFR BLD AUTO: 44 % (ref 35–47)
HDLC SERPL-MCNC: 74 MG/DL
HGB BLD-MCNC: 14.3 G/DL (ref 11.7–15.7)
HGB UR QL STRIP: NEGATIVE
KETONES UR STRIP-MCNC: NEGATIVE MG/DL
LDLC SERPL CALC-MCNC: 124 MG/DL
LEUKOCYTE ESTERASE UR QL STRIP: NEGATIVE
MCH RBC QN AUTO: 29.7 PG (ref 26.5–33)
MCHC RBC AUTO-ENTMCNC: 32.5 G/DL (ref 31.5–36.5)
MCV RBC AUTO: 91 FL (ref 78–100)
NITRATE UR QL: NEGATIVE
NONHDLC SERPL-MCNC: 144 MG/DL
PH UR STRIP: 7 PH (ref 5–7)
PLATELET # BLD AUTO: 224 10E9/L (ref 150–450)
POTASSIUM SERPL-SCNC: 4.1 MMOL/L (ref 3.4–5.3)
PROT SERPL-MCNC: 7.9 G/DL (ref 6.8–8.8)
RBC # BLD AUTO: 4.82 10E12/L (ref 3.8–5.2)
RBC #/AREA URNS AUTO: 1 /HPF (ref 0–2)
SODIUM SERPL-SCNC: 137 MMOL/L (ref 133–144)
SOURCE: NORMAL
SP GR UR STRIP: 1 (ref 1–1.03)
SQUAMOUS #/AREA URNS AUTO: <1 /HPF (ref 0–1)
TRIGL SERPL-MCNC: 99 MG/DL
UROBILINOGEN UR STRIP-MCNC: 0 MG/DL (ref 0–2)
WBC # BLD AUTO: 4.3 10E9/L (ref 4–11)
WBC #/AREA URNS AUTO: <1 /HPF (ref 0–5)

## 2018-12-06 ENCOUNTER — TRANSFERRED RECORDS (OUTPATIENT)
Dept: HEALTH INFORMATION MANAGEMENT | Facility: CLINIC | Age: 58
End: 2018-12-06

## 2019-01-15 ENCOUNTER — MYC MEDICAL ADVICE (OUTPATIENT)
Dept: INTERNAL MEDICINE | Facility: CLINIC | Age: 59
End: 2019-01-15

## 2019-01-15 DIAGNOSIS — E78.5 HYPERLIPIDEMIA: Primary | ICD-10-CM

## 2019-11-04 DIAGNOSIS — M41.9 SCOLIOSIS: Primary | ICD-10-CM

## 2019-11-05 ASSESSMENT — ENCOUNTER SYMPTOMS: BACK PAIN: 1

## 2019-11-07 ENCOUNTER — ANCILLARY PROCEDURE (OUTPATIENT)
Dept: GENERAL RADIOLOGY | Facility: CLINIC | Age: 59
End: 2019-11-07
Attending: ORTHOPAEDIC SURGERY
Payer: COMMERCIAL

## 2019-11-07 ENCOUNTER — OFFICE VISIT (OUTPATIENT)
Dept: ORTHOPEDICS | Facility: CLINIC | Age: 59
End: 2019-11-07
Payer: COMMERCIAL

## 2019-11-07 VITALS — BODY MASS INDEX: 25.03 KG/M2 | WEIGHT: 136 LBS | HEIGHT: 62 IN

## 2019-11-07 DIAGNOSIS — M41.125 ADOLESCENT IDIOPATHIC SCOLIOSIS OF THORACOLUMBAR REGION: Primary | ICD-10-CM

## 2019-11-07 ASSESSMENT — MIFFLIN-ST. JEOR: SCORE: 1157.14

## 2019-11-07 NOTE — LETTER
2019       RE: Latricia Epperson  [id number] [facility] [address]     Dear Colleague,    Thank you for referring your patient, Latricia Epperson, to the [facility] at [facility]. Please see a copy of my visit note below.    [facility]  Orthopedics  Ja Caraballo MD  2019     Name: Latricia Epperson  MRN: [id number]  Age: 59 year old  : 1960  Referring provider: Ja Kinney     Chief Complaint: Scoliosis routine follow-up    History of Present Illness:   Latricia Epperson is a 59 year old female with thoracolumbar scoliosis diagnosed in her teens and treated conservatively with increasing lumbar discomfort over the past several years, controlled with conservative measures. I last evaluated the patient on 17, at which time we had a long discussion regarding the possible surgical interventions for her scoliosis. We also discussed the risks benefits alternatives and recovery. At this time patient wanted to continue conservative therapies. Today, the patient reports that she has been doing her scoliosis specific exercises. She has been doing well over the past year, but recently she has been doing a great deal of physical activity at home because she is moving and selling her house. She also reports that she can sneeze without pain now. She notes when bending and pull, as if pulling weeds out of the ground, she experiences a snapping pain in her back. For work, the patient went on partial disability and it has been very helpful for her work life.    SUZIE: 37.38  Ytmcne70: 26   Pain scale: 5    Review of Systems:   A 10-point review of systems was obtained and is negative except for as noted in the HPI.     Physical Examination:  Deferred exam.    Imaging:  Radiographs of the spine complete scoliosis + six foot standing extremities - 3 views (2019)  Very stable appearing thoracolumbar curvature   Overall side alignment is good  71 deg  curvature    I have independently reviewed the above imaging studies; the results were discussed with the patient.     Assessment:   59 year old female with  adult scoliosis diagnosed in her teens and treated conservatively    Plan:   - Curvature magnitude is similar as before, 71 degrees today vs 75 degrees at the last visit. There are significant arthritic changes along with a scoliotic deformity. We do not expect this to change. She has limited work capability and if her pain were worse and if she desires it, she can pursue a spinal fusion for this magnitude of deformity. The fact that she is able to work is a credit to her, and we will let her work as tolerated but she cannot do any heavy work or a full-time schedule.   - Follow-up after 2 for routine evaluation; repeat XRs at this time     Scribe Disclosure:  I, Luis Baugh, am serving as a scribe to document services personally performed by Ja Caraballo MD at this visit, based upon the provider's statements to me. All documentation has been reviewed by the aforementioned provider prior to being entered into the official medical record.     She has been doing scoliosis specific exercises under the direction of the team at [facility]. She feels this has significantly helped her to feel better.    Ja Caraballo MD

## 2019-11-07 NOTE — NURSING NOTE
"Reason For Visit:   Chief Complaint   Patient presents with     RECHECK     scoliosis, thoracolumbar region, F/U 2 year       Primary MD: Mya Lowe  Ref. MD: Dr. Kinney    ?  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  Work status?  Part-time, part time disability    Date of injury: no  Type of injury: no.    Date of surgery: no  Type of surgery: no.    Smoker: No  Request smoking cessation information: No    Ht 1.578 m (5' 2.13\")   Wt 61.7 kg (136 lb)   BMI 24.77 kg/m      Pain Assessment  Patient Currently in Pain: Yes  0-10 Pain Scale: 5  Primary Pain Location: Back  Pain Descriptors: Aching  Alleviating Factors: Stretching(changng positions)  Aggravating Factors: (one position for too long)    Oswestry (SUZIE) Questionnaire    OSWESTRY DISABILITY INDEX 11/5/2019   Count 9   Sum 17   Oswestry Score (%) 37.78            Neck Disability Index (NDI) Questionnaire    No flowsheet data found.           Visual Analog Pain Scale  Back Pain Scale 0-10: 5.5  Right leg pain: 0  Left leg pain: 0  Neck Pain Scale 0-10: 0  Right arm pain: 0  Left arm pain: 0    Promis 10 Assessment    PROMIS 10 11/5/2019   In general, would you say your health is: Good   In general, would you say your quality of life is: Good   In general, how would you rate your physical health? Good   In general, how would you rate your mental health, including your mood and your ability to think? Good   In general, how would you rate your satisfaction with your social activities and relationships? Good   In general, please rate how well you carry out your usual social activities and roles Fair   To what extent are you able to carry out your everyday physical activities such as walking, climbing stairs, carrying groceries, or moving a chair? Moderately   How often have you been bothered by emotional problems such as feeling anxious, depressed or irritable? Rarely   How would you rate your fatigue on average? Mild   How would you " rate your pain on average?   0 = No Pain  to  10 = Worst Imaginable Pain 5   In general, would you say your health is: 3   In general, would you say your quality of life is: 3   In general, how would you rate your physical health? 3   In general, how would you rate your mental health, including your mood and your ability to think? 3   In general, how would you rate your satisfaction with your social activities and relationships? 3   In general, please rate how well you carry out your usual social activities and roles. (This includes activities at home, at work and in your community, and responsibilities as a parent, child, spouse, employee, friend, etc.) 2   To what extent are you able to carry out your everyday physical activities such as walking, climbing stairs, carrying groceries, or moving a chair? 3   In the past 7 days, how often have you been bothered by emotional problems such as feeling anxious, depressed, or irritable? 2   In the past 7 days, how would you rate your fatigue on average? 2   In the past 7 days, how would you rate your pain on average, where 0 means no pain, and 10 means worst imaginable pain? 5   Global Mental Health Score 13   Global Physical Health Score 13   PROMIS TOTAL - SUBSCORES 26                Traci Orozco LPN

## 2019-11-07 NOTE — PROGRESS NOTES
[facility]  Orthopedics  Ja Caraballo MD  2019     Name: Latricia Epperson  MRN: [id number]  Age: 59 year old  : 1960  Referring provider: Ja Kinney     Chief Complaint: Scoliosis routine follow-up    History of Present Illness:   Latricia Epperson is a 59 year old female with thoracolumbar scoliosis diagnosed in her teens and treated conservatively with increasing lumbar discomfort over the past several years, controlled with conservative measures. I last evaluated the patient on 17, at which time we had a long discussion regarding the possible surgical interventions for her scoliosis. We also discussed the risks benefits alternatives and recovery. At this time patient wanted to continue conservative therapies. Today, the patient reports that she has been doing her scoliosis specific exercises. She has been doing well over the past year, but recently she has been doing a great deal of physical activity at home because she is moving and selling her house. She also reports that she can sneeze without pain now. She notes when bending and pull, as if pulling weeds out of the ground, she experiences a snapping pain in her back. For work, the patient went on partial disability and it has been very helpful for her work life.    SUZIE: 37.38  [id number]: 26   Pain scale: 5    Review of Systems:   A 10-point review of systems was obtained and is negative except for as noted in the HPI.     Physical Examination:  Deferred exam.    Imaging:  Radiographs of the spine complete scoliosis + six foot standing extremities - 3 views (2019)  Very stable appearing thoracolumbar curvature   Overall side alignment is good  71 deg curvature    I have independently reviewed the above imaging studies; the results were discussed with the patient.     Assessment:   59 year old female with adult scoliosis diagnosed in her teens and treated conservatively    Plan:   - Curvature magnitude is similar as before, 71  degrees today vs 75 degrees at the last visit. There are significant arthritic changes along with a scoliotic deformity. We do not expect this to change. She has limited work capability and if her pain were worse and if she desires it, she can pursue a spinal fusion for this magnitude of deformity. The fact that she is able to work is a credit to her, and we will let her work as tolerated but she cannot do any heavy work or a full-time schedule.   - Follow-up after 2 for routine evaluation; repeat XRs at this time     Scribe Disclosure:  I, Luis Baugh, am serving as a scribe to document services personally performed by Ja Caraballo MD at this visit, based upon the provider's statements to me. All documentation has been reviewed by the aforementioned provider prior to being entered into the official medical record.     She has been doing scoliosis specific exercises under the direction of the team at [facility]. She feels this has significantly helped her to feel better.    Ja Caraballo MD

## 2019-12-05 ENCOUNTER — TRANSFERRED RECORDS (OUTPATIENT)
Dept: HEALTH INFORMATION MANAGEMENT | Facility: CLINIC | Age: 59
End: 2019-12-05

## 2020-03-10 ENCOUNTER — HEALTH MAINTENANCE LETTER (OUTPATIENT)
Age: 60
End: 2020-03-10

## 2020-04-20 ENCOUNTER — OFFICE VISIT - RIVER FALLS (OUTPATIENT)
Dept: FAMILY MEDICINE | Facility: CLINIC | Age: 60
End: 2020-04-20

## 2020-08-18 ENCOUNTER — OFFICE VISIT - RIVER FALLS (OUTPATIENT)
Dept: FAMILY MEDICINE | Facility: CLINIC | Age: 60
End: 2020-08-18
Payer: COMMERCIAL

## 2020-12-27 ENCOUNTER — HEALTH MAINTENANCE LETTER (OUTPATIENT)
Age: 60
End: 2020-12-27

## 2021-03-06 ENCOUNTER — HEALTH MAINTENANCE LETTER (OUTPATIENT)
Age: 61
End: 2021-03-06

## 2021-03-26 ENCOUNTER — MYC MEDICAL ADVICE (OUTPATIENT)
Dept: INTERNAL MEDICINE | Facility: CLINIC | Age: 61
End: 2021-03-26

## 2021-04-24 ENCOUNTER — HEALTH MAINTENANCE LETTER (OUTPATIENT)
Age: 61
End: 2021-04-24

## 2021-07-08 ENCOUNTER — OFFICE VISIT (OUTPATIENT)
Dept: INTERNAL MEDICINE | Facility: CLINIC | Age: 61
End: 2021-07-08
Payer: COMMERCIAL

## 2021-07-08 VITALS
WEIGHT: 140.7 LBS | DIASTOLIC BLOOD PRESSURE: 70 MMHG | HEART RATE: 63 BPM | BODY MASS INDEX: 25.63 KG/M2 | OXYGEN SATURATION: 97 % | SYSTOLIC BLOOD PRESSURE: 107 MMHG

## 2021-07-08 DIAGNOSIS — Z13.220 SCREENING FOR HYPERLIPIDEMIA: Primary | ICD-10-CM

## 2021-07-08 DIAGNOSIS — M41.35 THORACOGENIC SCOLIOSIS OF THORACOLUMBAR REGION: ICD-10-CM

## 2021-07-08 PROCEDURE — 99396 PREV VISIT EST AGE 40-64: CPT | Performed by: NURSE PRACTITIONER

## 2021-07-08 NOTE — PROGRESS NOTES
S: Latricia Epperson is a 60 year old female with a history of scoliosis and fibroids who presents for a routine physical exam. Latricia does not need a pap smear, as she had a hysterectomy in 2007. However, she does still have ovaries, and would like a pelvic exam. She denies pelvic pain or concern about STD exposure.    Her last mammo was 12/4/18.  She stopped smoking 2 weeks ago.   Colonoscopy 8/18/20. Recommended f/u 10 years.     She feels well.  She continues to do scoliosis home exercises under the instruction of Virginia Booker P.T. at [facility] in [facility].  She typically does her exercises three times a week.  She is also followed by Dr. Caraballo here in Orthopedics. She cannot work full time due to her severity of scoliosis.    She has had a hysterectomy but still has ovaries.     She is otherwise feeling well with no new complaints.       Patient Active Problem List   Diagnosis     History of colonic polyps, due for colonoscopy      Last colonoscopy 11/27/18 with recommendation to f/u in 5 years to to tubular adenomas.       Past Medical History:   Diagnosis Date     Anemia      Fibroids      Radon exposure      Scoliosis of thoracic spine, unspecified scoliosis        Past Surgical History:   Procedure Laterality Date     HYSTERECTOMY  2007    fibroids     TONSILLECTOMY              Social History     Tobacco Use     Smoking status: Current Some Day Smoker     Smokeless tobacco: Never Used     Tobacco comment: occ smoker   Substance Use Topics     Alcohol use: Not on file     Comment: 3 drinks a month            Family History   Problem Relation Age of Onset     Cirrhosis Father      Heart Disease Father      Depression Mother      Bipolar Disorder Mother      Rheumatoid Arthritis Mother      Hyperlipidemia Brother      Thyroid Disease Sister      Depression Sister      Osteoporosis Paternal Grandmother      Coronary Artery Disease Paternal Grandmother      Coronary Artery Disease  "Paternal Grandfather      Osteoarthritis Other      Rashes/Skin Problems Maternal Aunt      Rheumatoid Arthritis Maternal Aunt      Cirrhosis Maternal Aunt      Rheumatoid Arthritis Maternal Aunt      Rheumatoid Arthritis Maternal Aunt      Depression Maternal Aunt      Rheumatoid Arthritis Maternal Aunt      Depression Maternal Aunt      Rheumatoid Arthritis Maternal Aunt              No Known Allergies         Current Outpatient Medications   Medication Sig Dispense Refill     Calcium-Vitamin D-Vitamin K (CALCIUM + D + K PO) Take 1 tablet by mouth daily       Cholecalciferol (VITAMIN D-3) 1000 UNITS CAPS Take 1 tablet by mouth every other day       Multiple Vitamin (MULTI-VITAMIN DAILY PO)        Omega-3 Fatty Acids (OMEGA-3 FISH OIL PO)        TURMERIC CURCUMIN PO Take 1 tablet by mouth every other day          REVIEW OF SYSTEMS:    Ears/Nose/Throat: negative    Dental exam: negative    Eyes: negative.  Normal.    Respiratory: negative    Cardiovascular: negative    Gastrointestinal: negative    Genitourinary: negative    Musculoskeletal: she has back pain if she over exerts.      Neurologic: negative     Skin: negative     Psychiatric: negative     Hematologic/Lymphatic/Immunologic:  negative      Endocrine:  negative     O:   Vital signs:                      Weight: 63.8 kg (140 lb 11.2 oz)  Estimated body mass index is 25.63 kg/m  as calculated from the following:    Height as of 11/7/19: 1.578 m (5' 2.13\").    Weight as of this encounter: 63.8 kg (140 lb 11.2 oz).      /70   Pulse 63   Wt 63.8 kg (140 lb 11.2 oz)   SpO2 97%   Breastfeeding No   BMI 25.63 kg/m      GENERAL APPEARANCE: healthy, alert and no distress  EYES: EOMI,  PERRL  HENT: ear canals and TM's normal and mouth without ulcers or lesions  RESP: lungs clear to auscultation - no rales, rhonchi or wheezes  CV: regular rates and rhythm, normal S1 S2, no S3 or S4 and no murmur, click or rub   ABDOMEN:  soft, nontender, no HSM or masses " and bowel sounds normal  NEURO:normal.  MUSK:   SKIN: normal  LYMPH:neg cervical, axillary or pelvic nodes.   EXT: warm.  Edema NO   PSYCHE: normal.     A/P:  Latricia was seen today for physical.    Diagnoses and all orders for this visit:    Screening for hyperlipidemia  -     Lipid panel reflex to direct LDL Fasting; Future  -     Basic metabolic panel  (Ca, Cl, CO2, Creat, Gluc, K, Na, BUN); Future  -     CBC with platelets; Future  -     TSH; Future           Thoracogenic scoliosis of thoracolumbar region      Screening for breast Cancer  -     MA Diagnostic Digital Bilateral    Total time spent today with this patient including chart review, exam time with patient and documentation : 60 minutes.      Mya MARTÍNEZ, CNP

## 2021-07-08 NOTE — PATIENT INSTRUCTIONS
Patient Education     Understanding Your Cholesterol Numbers     Blood flows easily when arteries are clear.      Less blood flows when cholesterol builds up in artery walls.   The higher your blood cholesterol, the greater your risk for heart attack, cardiovascular disease, or stroke. High cholesterol can cause any artery in your body to become narrow. That s why you need to know your cholesterol level. If it s high, you can take steps to bring it down. Eating the right foods and getting enough exercise can help. Some people also need medicine to control their cholesterol. Your healthcare provider can help you identify your personal risk through screenings tests and a discussion about different factors that many increase your chance for heart disease and stroke. These include family history, age, gender, ethnicity, and current health. Your healthcare provider can help you get started on a plan to control your cholesterol.  Checking your cholesterol  Your cholesterol is checked with a simple blood test. The results tell you how much cholesterol you have in your blood. Get checked as often as your healthcare provider suggests. Start monitoring your cholesterol levels regularly after age 20. Check it earlier if you have an increased risk for either high cholesterol or heart disease. If you have diabetes or high cholesterol, you may need your blood tested as often as every 3 months. As you work to lower your cholesterol, your numbers will change slowly. But they will change. Be patient and stay on track. Discuss your numbers with your healthcare provider.   Your total cholesterol number  A blood test will give you a number for the total amount of cholesterol in your blood. The higher this number, the more likely it is that cholesterol will build up in your blood vessels. Even if your cholesterol is just slightly high, you are at increased risk for health problems.  My total cholesterol is: ________________   Your  "lipid numbers  Total cholesterol is just one part of the story. Cholesterol is made up of different kinds of fats (lipids). If your total cholesterol is high, knowing your lipid profile is important. The 2 most important lipids are HDL and LDL. Your healthcare provider will tell you if you should fast before having your lipids checked. Fasting means you don t eat for a certain amount of time before the test is done. Along with cholesterol, triglyceride can also lead to blocked arteries. Triglycerides are another type of fat. Knowing your HDL, LDL, and triglyceride numbers, as well as your total cholesterol gives you a more complete picture of your cholesterol level:    HDL is called the  good  cholesterol. It moves the \"bad\" cholesterol out of the bloodstream and does not block your blood vessels. HDL levels are affected by how much you exercise and what you eat. This is the type of cholesterol that you don't want too little of. The higher the HDL, the better.My HDL cholesterol is:  ________________    LDL is called the  bad  cholesterol. This is because it can stick to your artery walls and block blood flow. LDL levels are most affected by what you eat and by your genes. LDL cholesterol is clustered with other cholesterol types including apolipoprotein B (apoB) and lipoprotein a [Lp(a)]. Higher levels of these cholesterol types can increase your risk for atherosclerosis.My LDL cholesterol is:  ________________    Triglyceride is a type of fat the body uses to store energy. Too much triglyceride can increase your risk for heart disease. Triglyceride levels should be under 150. If your triglyceride level is above 200 mg/dL your provider may want to look at another cholesterol type called apolipoprotein B (apoB).My triglyceride is:  ________________  High cholesterol is only one of the big risk factor for heart disease heart attack, stroke, and peripheral artery disease. If your cholesterol levels are higher than " normal, your healthcare provider will help you with steps to take to lower your levels. Steps may include lifestyle changes such as diet, physical activity, and quitting smoking. Your provider may also prescribe medicine to lower bad cholesterol levels. Based on your other health issues and risk factors, your healthcare provider may have specific goals for treating your cholesterol. You may need to use different kinds of medicines that work on the different types of cholesterol.  Some people may need to take medicines called statins to control their cholesterol. This is in addition to eating a healthy diet and getting regular exercise. Statins can help you stay healthy. They can also help prevent a heart attack or stroke. Also ask your provider about any side effects your medicines may cause. Let your provider know about any side effects you have. Make a plan to have regular cholesterol checks.  Work with your provider to know and understand what your cholesterol numbers mean, what your risk factors are and what are your treatment options and goals. Make sure you understand why these goals are important, based on your own health history and your family history of heart disease or high cholesterol. Stick with your treatment plan to reach the goals you set.  [employer] last reviewed this educational content on 7/1/2019 2000-2021 [employer]. All rights reserved. This information is not intended as a substitute for professional medical care. Always follow your healthcare professional's instructions.           Patient Education     What Is Osteoporosis?  Osteoporosis is a disease that weakens the bones. Weakened bones are more likely to break (fracture). Osteoporosis affects both men and women. But postmenopausal women are most at risk. To help prevent osteoporosis, you need to exercise and nourish your bones throughout your life.     Childhood  The body builds the most bone during these years. That's why  boys and girls need foods rich in calcium. They also need plenty of exercise. A healthy diet and exercise helps bones grow strong.   Young adulthood to age 30  During young adulthood, bones become their strongest. This is called peak bone mass. The same good habits that kept bones healthy in childhood help keep bones healthy in adulthood.   Age 30 to menopause  Bone mass declines slightly during these years. Your body makes just enough new bone to maintain peak bone mass. To keep your bones at their peak mass, be sure to exercise and get plenty of calcium.   After menopause  Menopause is when a woman stops having monthly periods. After menopause, the body makes less estrogen (female hormone). This increases bone loss. At this point, treatment may be needed to reduce the risk for fracture. Exercise and calcium can also help keep your bones strong.   Later in life  In later years, both men and women need to take extra care of their bones. By this point, the body loses more bone than it makes. If too much bone is lost, you may be at increased risk for fractures. With age, the quality and quantity of bone declines. You can lessen bone loss by staying active and increasing your calcium intake. Calcium supplements and other osteoporosis treatments do have risks. So talk with your healthcare provider if you have concerns. If you have osteoporosis, you can also learn ways to increase everyday safety.   [employer] last reviewed this educational content on 3/1/2020    0850-9914 [employer]. All rights reserved. This information is not intended as a substitute for professional medical care. Always follow your healthcare professional's instructions.           Patient Education     Vitamin D  Does this test have other names?  25-hydroxyvitamin D (25-high-DROX-ee-VIE-tuh-min D), 25(OH)D  What is this test?  Vitamin D is mainly found in fortified dairy foods, juice, breakfast cereal, and certain fish. This vitamin  plays many roles in the body. But because it helps the body absorb calcium from foods and supplements, it's particularly important for bone health. Vitamin D has many additional roles in the body.   Vitamin D comes in several forms. When ultraviolet light, such as sunlight, hits your skin, it creates vitamin D3. D2 is used to fortify dairy foods. Both of these are further processed by your liver and kidneys into a form your body can use. Most tests for vitamin D check the level of a form circulating in the body called 25-hydroxyvitamin D, also called 25(OH)D.    Why do I need this test?  You may need this test if your healthcare provider wants to check your vitamin D levels to find out if you have any risks to bone health. These might be:     Low calcium    Soft bones caused by low vitamin D or problems using it (osteomalacia)    Osteopenia    Osteoporosis    Rickets, in children  You may also need this test if you are at risk for low vitamin D levels. Risks include:    Being an older adult    Having difficulty absorbing fat from your diet    Having chronic kidney disease    Have dark skin pigmentation    Being a  baby  Vitamin D has many effects in the body. You may need this test to help your healthcare provider diagnose or treat:     Problems with the parathyroid gland    Cancer    Autoimmune diseases, such as multiple sclerosis and inflammatory bowel disease    Psoriasis    Asthma    Weakness or falls    What other tests might I have along with this test?  A healthcare provider may also want to check your parathyroid hormone levels and your calcium levels.    What do my test results mean?  Test results may vary depending on your age, gender, health history, the method used for the test, and other things. Your test results may not mean you have a problem. Ask your healthcare provider what your test results mean for you.    Although exact levels have not been identified, experts believe that levels of 20  nanograms per milliliter (ng/mL) is enough for good bone and overall health. . Recommended daily amounts range from 400 to 800 international units (IU) per day based on your age.   Levels lower than normal can mean you are:    Not making enough vitamin D on your own    Not getting enough vitamin D in your diet    Not absorbing vitamin D from your food as you should  Lower levels may also mean that your body is not converting the vitamin as it should. This might be because of kidney or liver disease.   Above-normal levels may be a sign that you're taking too much in supplement form.   How is this test done?  The test is done with a blood sample. A needle is used to draw blood from a vein in your arm or hand.    Does this test pose any risks?  Having a blood test with a needle carries some risks. These include bleeding, infection, bruising, and feeling lightheaded. When the needle pricks your arm or hand, you may feel a slight sting or pain. Afterward, the site may be sore.    What might affect my test results?  Your age, the amount of time you spend in the sunlight, your diet, and whether you take vitamin D in supplement form can affect your vitamin D levels. Ask your healthcare provider if any health conditions you have or medicines you take could affect your results.   How do I get ready for this test?  Tell your healthcare provider if you take vitamin D supplements. Be sure your healthcare provider knows about all medicines, herbs, vitamins, and supplements you are taking. This includes medicines that don't need a prescription and any illegal drugs you may use.    [email or web address] last reviewed this educational content on 10/1/2020    7403-9080 [employer]. All rights reserved. This information is not intended as a substitute for professional medical care. Always follow your healthcare professional's instructions.           Patient Education     Preventing Osteoporosis: Avoiding Bone Loss  Certain factors  can speed up bone loss or decrease bone growth. For example, alcohol, cigarettes, and certain medicines reduce bone mass. Some foods make it hard for your body to absorb calcium.    Things to avoid  Here are things to avoid to help prevent osteoporosis:    Alcohol. This is toxic to bones. It is a major cause of bone loss. Heavy drinking can cause osteoporosis even if you have no other risk factors.    Smoking. This reduces bone mass. Smoking may also interfere with estrogen levels and cause early menopause.    Inactivity. Not being active makes your bones lose strength and become thinner. Over time, thin bones may break. Women who aren't active are at a high risk for osteoporosis.    Certain medicines. Some medicines, such as cortisone, increase bone loss. They also decrease bone growth. Ask your healthcare provider about any side effects of your medicines, and how to prevent them.    Protein-rich or salty foods. Eaten in large amounts, these foods may deplete calcium.    Caffeine. This increases calcium loss. People who drink a lot of coffee, tea, or soda lose more calcium than those who don't.  [employer] last reviewed this educational content on 5/1/2018 2000-2021 [employer]. All rights reserved. This information is not intended as a substitute for professional medical care. Always follow your healthcare professional's instructions.

## 2021-07-08 NOTE — NURSING NOTE
Chief Complaint   Patient presents with     Physical     annual physical       PARVEZ Diego at 2:17 PM on 7/8/2021

## 2021-07-09 ENCOUNTER — TELEPHONE (OUTPATIENT)
Dept: INTERNAL MEDICINE | Facility: CLINIC | Age: 61
End: 2021-07-09

## 2021-07-09 DIAGNOSIS — Z12.31 ENCOUNTER FOR SCREENING MAMMOGRAM FOR BREAST CANCER: Primary | ICD-10-CM

## 2021-07-09 NOTE — TELEPHONE ENCOUNTER
[facility] Call Center    Phone Message    May a detailed message be left on voicemail: yes     Reason for Call: Order(s): Other:   Reason for requested: Mammogram order is for diagnostic. Patient was asked if she when scheduling if she is having issues. Patient advised no. Imaging scheduling requested patient call her PCP to change order to screening mammogram. Please follow up with patient when this has been changed so she can schedule. Thank you!     Date needed: asap  Provider name: Mynor      Action Taken: Message routed to:  [facility] pcc    Travel Screening: Not Applicable

## 2021-07-10 PROBLEM — M41.35 THORACOGENIC SCOLIOSIS OF THORACOLUMBAR REGION: Status: ACTIVE | Noted: 2021-07-10

## 2021-07-12 NOTE — TELEPHONE ENCOUNTER
"Left patient VM.  Order for regular mammogram screening placed.  Patient may call to schedule.    07/08/21 physical exam- see no documentation of breast concern.  Mammogram order was placed for \"screen for breast cancer.\"  Last mammogram done in 2018 recommends annual screening.     Order for routine mammogram screen placed.      Chuy Brown CMA ([facility]) at 12:46 PM on 7/12/2021     "

## 2021-07-26 ENCOUNTER — LAB (OUTPATIENT)
Dept: LAB | Facility: CLINIC | Age: 61
End: 2021-07-26
Payer: COMMERCIAL

## 2021-07-26 DIAGNOSIS — Z13.220 SCREENING FOR HYPERLIPIDEMIA: ICD-10-CM

## 2021-07-26 LAB
ANION GAP SERPL CALCULATED.3IONS-SCNC: 12 MMOL/L (ref 5–18)
BUN SERPL-MCNC: 13 MG/DL (ref 8–22)
CALCIUM SERPL-MCNC: 10.1 MG/DL (ref 8.5–10.5)
CHLORIDE BLD-SCNC: 102 MMOL/L (ref 98–107)
CHOLEST SERPL-MCNC: 207 MG/DL
CO2 SERPL-SCNC: 27 MMOL/L (ref 22–31)
CREAT SERPL-MCNC: 0.93 MG/DL (ref 0.6–1.1)
ERYTHROCYTE [DISTWIDTH] IN BLOOD BY AUTOMATED COUNT: 12.4 % (ref 10–15)
FASTING STATUS PATIENT QL REPORTED: YES
GFR SERPL CREATININE-BSD FRML MDRD: 67 ML/MIN/1.73M2
GLUCOSE BLD-MCNC: 82 MG/DL (ref 70–125)
HCT VFR BLD AUTO: 44.5 % (ref 35–47)
HDLC SERPL-MCNC: 73 MG/DL
HGB BLD-MCNC: 14.9 G/DL (ref 11.7–15.7)
LDLC SERPL CALC-MCNC: 113 MG/DL
MCH RBC QN AUTO: 30.1 PG (ref 26.5–33)
MCHC RBC AUTO-ENTMCNC: 33.5 G/DL (ref 31.5–36.5)
MCV RBC AUTO: 90 FL (ref 78–100)
PLATELET # BLD AUTO: 263 10E3/UL (ref 150–450)
POTASSIUM BLD-SCNC: 4.6 MMOL/L (ref 3.5–5)
RBC # BLD AUTO: 4.95 10E6/UL (ref 3.8–5.2)
SODIUM SERPL-SCNC: 141 MMOL/L (ref 136–145)
TRIGL SERPL-MCNC: 106 MG/DL
TSH SERPL DL<=0.005 MIU/L-ACNC: 3.27 UIU/ML (ref 0.3–5)
WBC # BLD AUTO: 4.3 10E3/UL (ref 4–11)

## 2021-07-26 PROCEDURE — 85027 COMPLETE CBC AUTOMATED: CPT

## 2021-07-26 PROCEDURE — 80061 LIPID PANEL: CPT

## 2021-07-26 PROCEDURE — 84443 ASSAY THYROID STIM HORMONE: CPT

## 2021-07-26 PROCEDURE — 80048 BASIC METABOLIC PNL TOTAL CA: CPT

## 2021-07-26 PROCEDURE — 36415 COLL VENOUS BLD VENIPUNCTURE: CPT

## 2021-07-28 ENCOUNTER — HOSPITAL ENCOUNTER (OUTPATIENT)
Dept: MAMMOGRAPHY | Facility: CLINIC | Age: 61
Discharge: HOME | End: 2021-07-28
Attending: NURSE PRACTITIONER | Admitting: NURSE PRACTITIONER
Payer: COMMERCIAL

## 2021-07-28 DIAGNOSIS — Z12.31 ENCOUNTER FOR SCREENING MAMMOGRAM FOR BREAST CANCER: ICD-10-CM

## 2021-07-28 PROCEDURE — 77067 SCR MAMMO BI INCL CAD: CPT

## 2021-10-09 ENCOUNTER — HEALTH MAINTENANCE LETTER (OUTPATIENT)
Age: 61
End: 2021-10-09

## 2021-10-25 DIAGNOSIS — M41.9 SCOLIOSIS: Primary | ICD-10-CM

## 2021-11-11 ENCOUNTER — MYC MEDICAL ADVICE (OUTPATIENT)
Dept: ORTHOPEDICS | Facility: CLINIC | Age: 61
End: 2021-11-11
Payer: COMMERCIAL

## 2021-11-12 ENCOUNTER — TELEPHONE (OUTPATIENT)
Dept: ORTHOPEDICS | Facility: CLINIC | Age: 61
End: 2021-11-12
Payer: COMMERCIAL

## 2021-11-12 NOTE — TELEPHONE ENCOUNTER
Writer called and talked with patient on the phone and informed pt that they will have imaging on first floor before coming up to 4th floor for apt. Pt agreed to plan.     Traci Orozco LPN

## 2021-11-17 ASSESSMENT — ENCOUNTER SYMPTOMS
DISTURBANCES IN COORDINATION: 0
MUSCLE CRAMPS: 0
TINGLING: 0
NECK PAIN: 1
ARTHRALGIAS: 0
NUMBNESS: 0
PARALYSIS: 0
TREMORS: 0
SEIZURES: 0
MYALGIAS: 1
MEMORY LOSS: 1
SPEECH CHANGE: 0
BACK PAIN: 1
LOSS OF CONSCIOUSNESS: 0
WEAKNESS: 0
HEADACHES: 0
DIZZINESS: 1
STIFFNESS: 1
MUSCLE WEAKNESS: 0
JOINT SWELLING: 0

## 2021-11-18 ENCOUNTER — ANCILLARY PROCEDURE (OUTPATIENT)
Dept: GENERAL RADIOLOGY | Facility: CLINIC | Age: 61
End: 2021-11-18
Attending: ORTHOPAEDIC SURGERY
Payer: COMMERCIAL

## 2021-11-18 ENCOUNTER — OFFICE VISIT (OUTPATIENT)
Dept: ORTHOPEDICS | Facility: CLINIC | Age: 61
End: 2021-11-18
Payer: COMMERCIAL

## 2021-11-18 VITALS — BODY MASS INDEX: 25.76 KG/M2 | WEIGHT: 140 LBS | HEIGHT: 62 IN

## 2021-11-18 DIAGNOSIS — M41.125 ADOLESCENT IDIOPATHIC SCOLIOSIS OF THORACOLUMBAR REGION: Primary | ICD-10-CM

## 2021-11-18 PROCEDURE — 77073 BONE LENGTH STUDIES: CPT | Performed by: STUDENT IN AN ORGANIZED HEALTH CARE EDUCATION/TRAINING PROGRAM

## 2021-11-18 PROCEDURE — 72082 X-RAY EXAM ENTIRE SPI 2/3 VW: CPT | Performed by: STUDENT IN AN ORGANIZED HEALTH CARE EDUCATION/TRAINING PROGRAM

## 2021-11-18 PROCEDURE — 99214 OFFICE O/P EST MOD 30 MIN: CPT | Performed by: ORTHOPAEDIC SURGERY

## 2021-11-18 ASSESSMENT — MIFFLIN-ST. JEOR: SCORE: 1166.54

## 2021-11-18 NOTE — NURSING NOTE
"Reason For Visit:   Chief Complaint   Patient presents with     RECHECK     scoliosis, thoracolumbar region, F/U 2 year       Primary MD: Mya Lowe  Ref. MD: Est    ?  No     Occupation disability  Currently working? No  Work status?  disability     Date of injury: no  Type of injury: no.     Date of surgery: no  Type of surgery: no.     Smoker: No  Request smoking cessation information: No    Ht 1.58 m (5' 2.21\")   Wt 63.5 kg (140 lb)   BMI 25.44 kg/m      Pain Assessment  Patient Currently in Pain: Yes  0-10 Pain Scale: 3  Primary Pain Location: Back    SRS 46%    Oswestry (SUZIE) Questionnaire    OSWESTRY DISABILITY INDEX 11/17/2021   Count 9   Sum 21   Oswestry Score (%) 46.67        Visual Analog Pain Scale  Back Pain Scale 0-10: 3  Right leg pain: 0  Left leg pain: 0  Neck Pain Scale 0-10: 0  Right arm pain: 0  Left arm pain: 0    Promis 10 Assessment    PROMIS 10 11/17/2021   In general, would you say your health is: Fair   In general, would you say your quality of life is: Very good   In general, how would you rate your physical health? Fair   In general, how would you rate your mental health, including your mood and your ability to think? Very good   In general, how would you rate your satisfaction with your social activities and relationships? Very good   In general, please rate how well you carry out your usual social activities and roles Fair   To what extent are you able to carry out your everyday physical activities such as walking, climbing stairs, carrying groceries, or moving a chair? Moderately   How often have you been bothered by emotional problems such as feeling anxious, depressed or irritable? Sometimes   How would you rate your fatigue on average? Mild   How would you rate your pain on average?   0 = No Pain  to  10 = Worst Imaginable Pain 5   In general, would you say your health is: 2   In general, would you say your quality of life is: 4   In general, how would you rate " your physical health? 2   In general, how would you rate your mental health, including your mood and your ability to think? 4   In general, how would you rate your satisfaction with your social activities and relationships? 4   In general, please rate how well you carry out your usual social activities and roles. (This includes activities at home, at work and in your community, and responsibilities as a parent, child, spouse, employee, friend, etc.) 2   To what extent are you able to carry out your everyday physical activities such as walking, climbing stairs, carrying groceries, or moving a chair? 3   In the past 7 days, how often have you been bothered by emotional problems such as feeling anxious, depressed, or irritable? 3   In the past 7 days, how would you rate your fatigue on average? 2   In the past 7 days, how would you rate your pain on average, where 0 means no pain, and 10 means worst imaginable pain? 5   Global Mental Health Score 15   Global Physical Health Score 12   PROMIS TOTAL - SUBSCORES 27   Some recent data might be hidden                Traci Orozco LPN

## 2021-11-18 NOTE — PROGRESS NOTES
"REASON FOR VISIT: RECHECK    [facility]     PRIMARY CARE PHYSICIAN: Mya Lowe    HISTORY OF PRESENT ILLNESS: Latricia Epperson is a 61 year old female who presents for follow-up on thoracolumbar scoliosis. Latricia notes that she feels better in comparison to when we last saw her. She notes that her pain is stable and due to being more aware on her limits. She is no longer working and believes that is contributing to improved quality of life. She denies any radicular symptoms at this time, but notes that she can only stand for 10 min before having worsening back pain. She occasionally feels like her left leg will give out when standing. She has improvement with walking with a walking pole; with this she can walk for 1 hr without any concerns.     She notes she would like to continue conservative management. She has been utilizing PT exercises daily. She takes tumeric for pain control, denies any gabapentin, NSAIDs, or narcotics. She denies any changes to bowel or bladder function. She denies any new weakness.      SRS 46%    Oswestry score:   Oswestry (SUZIE) Questionnaire    OSWESTRY DISABILITY INDEX 11/17/2021   Count 9   Sum 21   Oswestry Score (%) 46.67     PROMIS-10 Scores  Global Mental Health Score: (P) 15  Global Physical Health Score: (P) 12   PROMIS TOTAL - SUBSCORES: (P) 27     Visual Analog Scale (VAS) Questionnaire    VISUAL ANALOG PAIN SCALE 11/18/2021   Back Pain Scale 0-10 3   Right leg pain 0   Left leg pain 0   Neck Pain Scale 0-10 0   Right arm pain 0   Left arm pain 0      Pain scale: 5/10 intermittent.       PHYSICAL EXAM:    Vitals: Ht 1.58 m (5' 2.21\")   Wt 63.5 kg (140 lb)   BMI 25.44 kg/m      Constitutional: Patient is healthy, well-nourished and appears stated age. Right shoulder higher than left.     Respiratory: Patient is breathing normally and in no respiratory distress.    Skin: No suspicious rashes or lesions. Incision     Gait: Non-antalgic gait " without use of assistive devices. Able to tandem gait without difficulty.     Neurologic - Sensation intact to light touch bilaterally. Deep tendon reflexes 2+ patellar.     Musculoskeletal: Strength: 5/5 hip flexion, knee flexion, knee extension, dorsiflexion, plantar flexion     Spine: overall good sagittal balance  o Thoracic Spine:  - Appearance - Normal kyphosis  - Palpation - Non-tender to palpation   o Lumbosacral Spine:  - Non-tender to palpation, facets non-tender. Facet loading negative.   - ROM - Full, no pain      IMAGING:   The following imaging was independently reviewed and interpreted in clinic. See full radiologic report in chart.    XR Scoliosis    Stable 72 degree church angle thoracolumbar scoliosis apex at T12-L1 in comparison to previous imaging.           CLINICAL ASSESSMENT:   Latricia Epperson is a 61 year old female with thoracolumbar levoscoliosis     DISCUSSION/PLAN:   Latricia notes that symptoms are about the same as when we last saw her. She notes she feels good. Wants to continue conservative management. Denies any radicular symptoms. Still is still experiencing neurogenic claudication that is stable. Educated to continue PT and follow up in 2 year. She was educated to call if symptoms worsen or change prior to follow up appointment. She was educated to pursue a bone density scan of peripheral from her PCP. Educated to continue calcium and vitamin D replacement.     - continue PT recommendations  - follow up in 2 years with EOS scoliosis for observation.   - bone density workup prior to 2 year follow up.     All questions and concerns were answered to the patient's apparent satisfaction before leaving the clinic. We used models, the patients imaging, and drawn diagrams to explain the pathophysiology, and treatments options including surgical and non-surgical.     Thank you for allowing Dr. Caraballo and I to participate in the care of Latricia Epperson. The above plan was formulated by Dr. Caraballo who  also saw and examined the patient.     Respectfully,  Bishop ДМИТРИЙ Cardenas PA-C     I saw and evaluated the patient and developed the plan.  Ja Caraballo MD

## 2021-11-18 NOTE — LETTER
"    11/18/2021         RE: Latricia Epperson  [address]  [place] [place] [facility] [address]        Dear Colleague,    Thank you for referring your patient, Latricia Epperson, to the [facility] [place]. Please see a copy of my visit note below.    REASON FOR VISIT: RECHECK    REFERRING PHYSICIAN: Ja Kinney     PRIMARY CARE PHYSICIAN: Mya Lowe    HISTORY OF PRESENT ILLNESS: Latricia Epperson is a 61 year old female who presents for follow-up on thoracolumbar scoliosis. Latricia notes that she feels better in comparison to when we last saw her. She notes that her pain is stable and due to being more aware on her limits. She is no longer working and believes that is contributing to improved quality of life. She denies any radicular symptoms at this time, but notes that she can only stand for 10 min before having worsening back pain. She occasionally feels like her left leg will give out when standing. She has improvement with walking with a walking pole; with this she can walk for 1 hr without any concerns.     She notes she would like to continue conservative management. She has been utilizing PT exercises daily. She takes tumeric for pain control, denies any gabapentin, NSAIDs, or narcotics. She denies any changes to bowel or bladder function. She denies any new weakness.      SRS 46%    Oswestry score:   Oswestry (SUZIE) Questionnaire    OSWESTRY DISABILITY INDEX 11/17/2021   Count 9   Sum 21   Oswestry Score (%) 46.67     PROMIS-10 Scores  Global Mental Health Score: (P) 15  Global Physical Health Score: (P) 12   PROMIS TOTAL - SUBSCORES: (P) 27     Visual Analog Scale (VAS) Questionnaire    VISUAL ANALOG PAIN SCALE 11/18/2021   Back Pain Scale 0-10 3   Right leg pain 0   Left leg pain 0   Neck Pain Scale 0-10 0   Right arm pain 0   Left arm pain 0      Pain scale: 5/10 intermittent.       PHYSICAL EXAM:    Vitals: Ht 1.58 m (5' 2.21\")   Wt 63.5 kg (140 lb)   BMI 25.44 kg/m  "     Constitutional: Patient is healthy, well-nourished and appears stated age. Right shoulder higher than left.     Respiratory: Patient is breathing normally and in no respiratory distress.    Skin: No suspicious rashes or lesions. Incision     Gait: Non-antalgic gait without use of assistive devices. Able to tandem gait without difficulty.     Neurologic - Sensation intact to light touch bilaterally. Deep tendon reflexes 2+ patellar.     Musculoskeletal: Strength: 5/5 hip flexion, knee flexion, knee extension, dorsiflexion, plantar flexion     Spine: overall good sagittal balance  o Thoracic Spine:  - Appearance - Normal kyphosis  - Palpation - Non-tender to palpation   o Lumbosacral Spine:  - Non-tender to palpation, facets non-tender. Facet loading negative.   - ROM - Full, no pain      IMAGING:   The following imaging was independently reviewed and interpreted in clinic. See full radiologic report in chart.    XR Scoliosis    Stable 72 degree church angle thoracolumbar scoliosis apex at T12-L1 in comparison to previous imaging.           CLINICAL ASSESSMENT:   Latricia Epperson is a 61 year old female with thoracolumbar levoscoliosis     DISCUSSION/PLAN:   Latricia notes that symptoms are about the same as when we last saw her. She notes she feels good. Wants to continue conservative management. Denies any radicular symptoms. Still is still experiencing neurogenic claudication that is stable. Educated to continue PT and follow up in 2 year. She was educated to call if symptoms worsen or change prior to follow up appointment. She was educated to pursue a bone density scan of peripheral from her PCP. Educated to continue calcium and vitamin D replacement.     - continue PT recommendations  - follow up in 2 years with EOS scoliosis for observation.   - bone density workup prior to 2 year follow up.     All questions and concerns were answered to the patient's apparent satisfaction before leaving the clinic. We used  models, the patients imaging, and drawn diagrams to explain the pathophysiology, and treatments options including surgical and non-surgical.     Thank you for allowing Dr. Caraballo and I to participate in the care of Latricia Epperson. The above plan was formulated by Dr. Caraballo who also saw and examined the patient.     Respectfully,  ROCIO Valdez saw and evaluated the patient and developed the plan.  Ja Caraballo MD

## 2021-11-22 NOTE — TELEPHONE ENCOUNTER
See multiple my Chart messages & replies.    See last dictation 11-18-21 for plan.  I called pt. & advised what suppls. & dosing of Ca & Vit D will be determined by either Primary MD at [facility] or she can ask Primary for referral order to see Bone Health expert either in Sports clinic  or  or PMR clinic  or Endocrinology clinic.  Pt understands she will F/U with Primary MD to order DEXA scans to eval. Bone health & to order Vit D Level.  Calcium level was normal in July.    Reviewed her questions that yes Scoliosis could progress in future & no way to predict if or how much & risks of progression including could affect heart & lungs if gets severe down the road.  Besides [employer] link already sent to her, she can google SRS Scoliosis Research Society to research risks of surgery & treatments.  Continue Maddie PT.  Call back prn  Pt agreed.    Manuela Page RN.

## 2021-11-29 ENCOUNTER — MYC MEDICAL ADVICE (OUTPATIENT)
Dept: INTERNAL MEDICINE | Facility: CLINIC | Age: 61
End: 2021-11-29
Payer: COMMERCIAL

## 2021-12-03 NOTE — TELEPHONE ENCOUNTER
I have placed an order for Vitamin D and Dexa.  Radiology should call and schedule with you.  Call [phone] to schedule lab.  Shingrix can be obtained at your local pharmacy.  Much better coverage by insurance.     Mya MARTÍNEZ, CNP

## 2021-12-12 DIAGNOSIS — Z13.820 SCREENING FOR OSTEOPOROSIS: Primary | ICD-10-CM

## 2022-01-10 ENCOUNTER — ANCILLARY PROCEDURE (OUTPATIENT)
Dept: BONE DENSITY | Facility: CLINIC | Age: 62
End: 2022-01-10
Attending: NURSE PRACTITIONER
Payer: COMMERCIAL

## 2022-01-10 ENCOUNTER — LAB (OUTPATIENT)
Dept: LAB | Facility: CLINIC | Age: 62
End: 2022-01-10
Payer: COMMERCIAL

## 2022-01-10 DIAGNOSIS — Z13.820 SCREENING FOR OSTEOPOROSIS: ICD-10-CM

## 2022-01-10 DIAGNOSIS — Z78.0 ASYMPTOMATIC POSTMENOPAUSAL STATUS: ICD-10-CM

## 2022-01-10 PROCEDURE — 82306 VITAMIN D 25 HYDROXY: CPT

## 2022-01-10 PROCEDURE — 36415 COLL VENOUS BLD VENIPUNCTURE: CPT

## 2022-01-10 PROCEDURE — 77081 DXA BONE DENSITY APPENDICULR: CPT | Mod: TC | Performed by: RADIOLOGY

## 2022-01-10 PROCEDURE — 77080 DXA BONE DENSITY AXIAL: CPT | Mod: TC | Performed by: RADIOLOGY

## 2022-01-11 ENCOUNTER — VIRTUAL VISIT (OUTPATIENT)
Dept: INTERNAL MEDICINE | Facility: CLINIC | Age: 62
End: 2022-01-11
Payer: COMMERCIAL

## 2022-01-11 DIAGNOSIS — M85.89 OSTEOPENIA OF MULTIPLE SITES: ICD-10-CM

## 2022-01-11 LAB — DEPRECATED CALCIDIOL+CALCIFEROL SERPL-MC: 39 UG/L (ref 30–80)

## 2022-01-11 PROCEDURE — 99215 OFFICE O/P EST HI 40 MIN: CPT | Mod: 95 | Performed by: NURSE PRACTITIONER

## 2022-01-11 NOTE — NURSING NOTE
Latricia Epperson is a 61 year old female patient who is being evaluated via a billable video visit. The patient was called and checked-in for today's virtual visit. After the patient was checked in, Latricia Epperson's primary concerns for today's visit were discussed. The following are the primary complaints of the patient:     Chief Complaint   Patient presents with     RECHECK     Results     Results     Labs and DEXA     The patient's allergies and medications were reviewed as noted. The patient does not have any other questions for the provider.    Aleksandr Arceo, EMT at 2:44 PM on 1/11/2022

## 2022-01-12 PROBLEM — M85.89 OSTEOPENIA OF MULTIPLE SITES: Status: ACTIVE | Noted: 2022-01-12

## 2022-01-12 NOTE — CONFIDENTIAL NOTE
Fax was given to Mya and scanned into dominga's chart.    Lory Garcia on 1/12/2022 at 11:21 AM

## 2022-01-13 ENCOUNTER — MYC MEDICAL ADVICE (OUTPATIENT)
Dept: INTERNAL MEDICINE | Facility: CLINIC | Age: 62
End: 2022-01-13
Payer: COMMERCIAL

## 2022-01-13 NOTE — PROGRESS NOTES
Latricia Epperson is a 61 year old female year old who is being evaluated via a billable video visit.      How would you like to obtain your AVS? MyChart  If the video visit is dropped, the invitation should be resent by: Text to cell phone: see chart  Will anyone else be joining your video visit? No      Video Start Time: 3:34      Subjective   Latricia Epperson who presents for the following health issues .    HPI  Latricia is considering have orthopedic surgery on her spine to treat her scoliosis.  She would like this done with Dr. Caraballo within the next 3 years before he might be leaving the [employer].  She feels well and continues to walk (with a walking stick)  And ice  appliances on her shoes for weight bearing exercise but no longer does hazardous activities so to avoid a possible fall. She stopped her vitamin D supplement 2 weeks ago because she decided to have her vitamin D and her Dexa updated.  She obtained the results of her previous Dexa in 2015 to compare.     She would like to know if she should start medication therapy for her bone loss prior to being evaluated for surgery, and if so, be given the name of an Endocrinologist who specializes in osteoporosis.     Patient Active Problem List   Diagnosis     Thoracogenic scoliosis of thoracolumbar region     Osteopenia of multiple sites     Drug and lactation database from the [place] National Library of Medicine:  Current Outpatient Medications   Medication     Calcium-Vitamin D-Vitamin K (CALCIUM + D + K PO)     Multiple Vitamin (MULTI-VITAMIN DAILY PO)     TURMERIC CURCUMIN PO     No current facility-administered medications for this visit.       Review of Systems   See above        Objective     Vitals: No vitals were obtained today due to virtual visit.    Physical Exam   See results below.   This was a virtual visit.     2015 Dexa      EXAM: DX WRIST/HEEL/RADIUS, DX HIP/PELVIS/SPINE  LOCATION: [facility]  DATE/TIME:  1/10/2022 1:26 PM     INDICATION: Asymptomatic postmenopausal status.  COMPARISON: None.  TECHNIQUE: Dual-energy x-ray absorptiometry performed with routine technique.     FINDINGS:     Lumbar Spine: L1-L2: BMD: 0.648 g/cm2. T-score: -4.3. Z-score: -3.2. Degenerative changes and scoliotic curvature may artifactually change bone mineral density.  RIGHT Hip Total: BMD: 0.0764 g/cm2. T-score: -1.9. Z-score: -1.0  RIGHT Hip Femoral neck: BMD: 0.723 g/cm2. T-score: -2.3. Z-score: -1.1  LEFT Hip Total: BMD: 0.787 g/cm2. T-score: -1.8. Z-score: -0.9  LEFT Hip Femoral neck: BMD: 0.727 g/cm2. T-score: -2.2. Z-score: -1.0  RIGHT Radius 33%: BMD: 0.766 g/cm2. T-score: -1.3. Z-score: -0.4     Bone density loss since 2015.     Assessment and Plan  Scoliosis in patient with spinal osteoporosis.  This note and results will be discussed with Dr. Caraballo in Orthopedic Surgery and I will My Chart back to Ms. Epperson the results of that discussion.     Video-Visit Details    Type of service:  Video Visit    Video End Time : 4:13    Originating Location (pt. Location): Home    Distant Location (provider location):  [facility] INTERNAL MEDICINE [place]     Platform used for Video Visit: [employer]    Total time spent today with this patient including chart review, exam time with patient and documentation : 50 minutes.     Mya MARTÍNEZ, CNP

## 2022-01-20 DIAGNOSIS — M81.0 AGE-RELATED OSTEOPOROSIS WITHOUT CURRENT PATHOLOGICAL FRACTURE: ICD-10-CM

## 2022-01-20 DIAGNOSIS — R79.89 LOW VITAMIN D LEVEL: Primary | ICD-10-CM

## 2022-02-16 NOTE — TELEPHONE ENCOUNTER
Order, demographics, and note faxed to [facility]. they will contact patient to schedule.

## 2022-02-16 NOTE — PROGRESS NOTES
Patient:   SUSAN PARDO            MRN: [id number]            FIN: [id number]               Age:   59 years     Sex:  Female     :  1960   Associated Diagnoses:   Tubular adenoma   Author:   Abundio Padilla MD      Visit Information      Date of Service: 2020 08:12 am  Performing Location: [facility]  Encounter#: [id number]      Primary Care Provider (PCP):  97 -UNKNOWN,      Referring Provider:  Abundio Padilla MD    NPI# [id number]      Chief Complaint      History of Present Illness   Due to COVID pandemic appointment done through Telemedicine.    Last Colonoscopy 2015 with two small tubular adenomas.    Has scoliosis unchanged since last Colonoscopy      Review of Systems   Constitutional:  No fever.    Respiratory:  No shortness of breath.    Cardiovascular:  No chest pain.    Gastrointestinal:  No vomiting, No diarrhea, No constipation.    Hematology/Lymphatics:  No bruising tendency, No bleeding tendency.    All other systems reviewed and negative     Had blood transfusion 2007 with anemia from uterine fibroid and had hysterectomy  No prior problems with anesthesia  No family history of anesthesia problems  No positive responses for sleep apnea  Denies plavix, coumadin  Denies history of DVT/PE  Denies recent corticosteroid use    Able to walk a flight of stairs without chest pain or shortness of breath.      Health Status   Allergies:    Allergic Reactions (Selected)  No Known Medication Allergies   Medications:  (Selected)      Problem list:    All Problems  Resolved: Scoliosis / SNOMED CT 510356124  Resolved: Tobacco user / SNOMED CT 455128737      Histories   Procedure history:    Colonoscopy (SNOMED CT 652226888) performed by Abundio Padilla MD on 3/11/2015 at 54 Years.  Comments:  3/17/2015 11:01 AM CDT - Jayda MARAVILLA, [place]  Indication:   screening  Sedation:   MAC  Findings:   tubular adenoma x2--5mm and 4mm size.   hyperplastic polyp x1.  F/U:   5yrs.  Bone density  scan (SNOMED CT 962931170) on 2015 at 54 Years.  Comments:  2015 12:50 PM CST - Jayda MARAVILLA, N[place]  Per Dr. Rodriges, pt to have repeated in 3yrs.    Dx:   osteopenia     Social History: Smoker.  ([place])  Family History: No colon cancer or polyps. Mom alive. Dad  in 50's from alcoholism with cirrhosis. Has two siblings alive and healthy. Has three boys healthy      Impression and Plan   Diagnosis     Tubular adenoma ([id number] D36.9).       Tubular adenoma: Discussed risks and benefits of colonoscopy. Needed propofol last time.      Professional Services   Phone call 13 minutes

## 2022-02-16 NOTE — NURSING NOTE
Phone Message    PCP: YEIMY    Time of call: 8:50am message left    Person calling: Latricia  Contact # : [phone]    MESSAGE: Pt calls stating that she got a letter in the mail reminding her that she is due for a colonoscopy. She would like to get something scheduled for several months out to just get something on the books.    Last visit/reason: no visit in the past 2 years    9:30am Spoke with patient. She actually called back and has a telemedicine visit scheduled for 4/13/20. We discussed that the best way to go about this would be to wait until her phone visit. GJM can then decide how urgently she needs to get this scheduled. Pt agrees with this plan and has no further questions.

## 2022-02-16 NOTE — LETTER
(Inserted Image. Unable to display)   August 20, 2020      SUSAN PARDO  [address]  [place] [id number]        Dear SUSAN,      Thank you for selecting [facility] for your healthcare needs.     You had a colonoscopy done for colon polyp surveillance on Tuesday August 18th, 2020. It was normal. I would recommend a follow up colonoscopy in 10 years and sooner if new symptoms develop.            Please contact me or my assistant at [phone] if you have any questions or concerns.     Sincerely,        Markel Padilla MD

## 2022-02-16 NOTE — NURSING NOTE
Comprehensive Intake Entered On:  4/20/2020 11:12 AM CDT    Performed On:  4/20/2020 11:09 AM CDT by Abbie Jenkins CMA               Summary   Chief Complaint :   Colonoscopy consult  verbal consent given for telephone visit   Abbie Jenkins CMA - 4/20/2020 11:09 AM CDT   Health Status   Allergies Verified? :   Yes   Medication History Verified? :   Yes   Medical History Verified? :   No   Pre-Visit Planning Status :   Not completed   Tobacco Use? :   Former smoker   Abbie Jenkins CMA - 4/20/2020 11:09 AM CDT   Consents   Consent for Immunization Exchange :   Consent Granted   Consent for Immunizations to Providers :   Consent Granted   Abbie Jenkins CMA - 4/20/2020 11:09 AM CDT   Meds / Allergies   (As Of: 4/20/2020 11:12:34 AM CDT)   Allergies (Active)   No Known Medication Allergies  Estimated Onset Date:   Unspecified ; Created By:   Nury Montelongo MA; Reaction Status:   Active ; Category:   Drug ; Substance:   No Known Medication Allergies ; Type:   Allergy ; Updated By:   Nury Montelongo MA; Reviewed Date:   4/20/2020 11:12 AM CDT        Medication List   (As Of: 4/20/2020 11:12:34 AM CDT)

## 2022-03-01 ENCOUNTER — MYC MEDICAL ADVICE (OUTPATIENT)
Dept: INTERNAL MEDICINE | Facility: CLINIC | Age: 62
End: 2022-03-01
Payer: COMMERCIAL

## 2022-03-02 NOTE — CONFIDENTIAL NOTE
Printed form and placed in Mya [place] in box for completion.    Lory Garcia on 3/2/2022 at 11:16 AM

## 2022-03-31 ENCOUNTER — OFFICE VISIT (OUTPATIENT)
Dept: INTERNAL MEDICINE | Facility: CLINIC | Age: 62
End: 2022-03-31
Payer: COMMERCIAL

## 2022-03-31 VITALS
WEIGHT: 138.1 LBS | BODY MASS INDEX: 25.41 KG/M2 | OXYGEN SATURATION: 98 % | HEART RATE: 80 BPM | DIASTOLIC BLOOD PRESSURE: 78 MMHG | SYSTOLIC BLOOD PRESSURE: 137 MMHG | HEIGHT: 62 IN | RESPIRATION RATE: 16 BRPM

## 2022-03-31 DIAGNOSIS — M81.0 AGE-RELATED OSTEOPOROSIS WITHOUT CURRENT PATHOLOGICAL FRACTURE: Primary | ICD-10-CM

## 2022-03-31 PROCEDURE — 99215 OFFICE O/P EST HI 40 MIN: CPT | Performed by: NURSE PRACTITIONER

## 2022-03-31 RX ORDER — CX-024414 0.2 MG/ML
INJECTION, SUSPENSION INTRAMUSCULAR
COMMUNITY
Start: 2021-11-18

## 2022-03-31 NOTE — NURSING NOTE
"Latricia Epperson is a 61 year old female patient that presents today in clinic for the following:    Chief Complaint   Patient presents with     Forms     The patient's allergies and medications were reviewed as noted. A set of vitals were recorded as noted without incident: /78 (BP Location: Right arm, Patient Position: Sitting, Cuff Size: Adult Regular)   Pulse 80   Resp 16   Ht 1.58 m (5' 2.21\")   Wt 62.6 kg (138 lb 1.6 oz)   SpO2 98%   Breastfeeding No   BMI 25.09 kg/m  . The patient does not have any other questions for the provider.    Aleksandr Arceo, EMT at 12:10 PM on 3/31/2022  " Primary Defect Width In Cm (Final Defect Size - Required For Flaps/Grafts): 1.2

## 2022-04-01 NOTE — PROGRESS NOTES
S: Latricia Epperson is a 61 year old female who presents today for application for disability benefits.  She took a leave of work for almost a year, and then worked part-time. She is in sales for [employer]. She has decreased lung capacity. She has decrease tolerance for walking, sitting and standing. She has had a weight lifting restriction of 5 pounds and cannot tolerate standing more than 15 minutes or sitting more than 30 minutes without getting up and moving around.  She had a Dexa 22 and was resulted as osteoporosis at a bone density result of T score of -4.3.  They are very concerned about the possibility of her getting injured while at work.  She does not feel she can work productively any longer due to pain.  SHe does not take nor does she want to take any pain medication. She is considering a spinal surgery, and might seek out a second opinion as [facility].       Patient Active Problem List   Diagnosis     Thoracogenic scoliosis of thoracolumbar region     Osteopenia of multiple sites          Past Medical History:   Diagnosis Date     Anemia      Fibroids      Radon exposure      Scoliosis      Scoliosis of thoracic spine, unspecified scoliosis           Past Surgical History:   Procedure Laterality Date     HYSTERECTOMY  2007    fibroids     TONSILLECTOMY         Social History     Tobacco Use     Smoking status: Former Smoker     Packs/day: 0.25     Quit date: 2021     Years since quittin.7     Smokeless tobacco: Never Used     Tobacco comment: occ smoker   Substance Use Topics     Alcohol use: Not on file     Comment: 3 drinks a month          Family History   Problem Relation Age of Onset     Cirrhosis Father      Heart Disease Father      Depression Mother      Bipolar Disorder Mother      Rheumatoid Arthritis Mother      Hyperlipidemia Brother      Thyroid Disease Sister      Depression Sister      Osteoporosis Paternal Grandmother      Coronary Artery Disease Paternal Grandmother   "    Coronary Artery Disease Paternal Grandfather      Osteoarthritis Other      Rashes/Skin Problems Maternal Aunt      Rheumatoid Arthritis Maternal Aunt      Cirrhosis Maternal Aunt      Rheumatoid Arthritis Maternal Aunt      Rheumatoid Arthritis Maternal Aunt      Depression Maternal Aunt      Rheumatoid Arthritis Maternal Aunt      Depression Maternal Aunt      Rheumatoid Arthritis Maternal Aunt              No Known Allergies         Current Outpatient Medications   Medication Sig Dispense Refill     Calcium-Vitamin D-Vitamin K (CALCIUM + D + K PO) Take 1 tablet by mouth daily       Multiple Vitamin (MULTI-VITAMIN DAILY PO)        TURMERIC CURCUMIN PO Take 1 tablet by mouth every other day       MODERNA COVID-19 VACCINE 100 MCG/0.5ML injection          REVIEW OF SYSTEMS:  See above.    O:   /78 (BP Location: Right arm, Patient Position: Sitting, Cuff Size: Adult Regular)   Pulse 80   Resp 16   Ht 1.58 m (5' 2.21\")   Wt 62.6 kg (138 lb 1.6 oz)   SpO2 98%   Breastfeeding No   BMI 25.09 kg/m    GENERAL APPEARANCE: healthy, alert and no distress  CV: see VS.  STATURE: she has a flexion toward the right  NEURO: Grossly normal  MUSK:She has marked thoracic spinal curvature   EXT: warm.  Edema: none.  PSYCHE: normsal    A/P:  Latricia was seen today for forms.    Diagnoses and all orders for this visit:    Age-related osteoporosis without current pathological fracture    Left convex left curvature of the thoracic columbar spine.    Forms for disability completed stating she cannot tolerate work due to physical disability. See scanned form.    The patient voiced understanding of the information discussed and all questions were answered.     Total time spent today with this patient including chart review, exam time with patient and documentation : 53 minutes      Mya MARTÍNEZ CNP                  "

## 2022-07-10 ENCOUNTER — HEALTH MAINTENANCE LETTER (OUTPATIENT)
Age: 62
End: 2022-07-10

## 2022-08-05 ENCOUNTER — TRANSFERRED RECORDS (OUTPATIENT)
Dept: HEALTH INFORMATION MANAGEMENT | Facility: CLINIC | Age: 62
End: 2022-08-05

## 2022-09-11 ENCOUNTER — HEALTH MAINTENANCE LETTER (OUTPATIENT)
Age: 62
End: 2022-09-11

## 2023-01-20 ENCOUNTER — TRANSFERRED RECORDS (OUTPATIENT)
Dept: HEALTH INFORMATION MANAGEMENT | Facility: CLINIC | Age: 63
End: 2023-01-20

## 2023-05-25 ENCOUNTER — TRANSFERRED RECORDS (OUTPATIENT)
Dept: HEALTH INFORMATION MANAGEMENT | Facility: CLINIC | Age: 63
End: 2023-05-25
Payer: COMMERCIAL

## 2023-07-29 ENCOUNTER — HEALTH MAINTENANCE LETTER (OUTPATIENT)
Age: 63
End: 2023-07-29

## 2023-08-01 ENCOUNTER — TELEPHONE (OUTPATIENT)
Dept: ORTHOPEDICS | Facility: CLINIC | Age: 63
End: 2023-08-01
Payer: COMMERCIAL

## 2023-08-03 ENCOUNTER — TRANSFERRED RECORDS (OUTPATIENT)
Dept: HEALTH INFORMATION MANAGEMENT | Facility: CLINIC | Age: 63
End: 2023-08-03
Payer: COMMERCIAL

## 2023-08-14 ENCOUNTER — NURSE TRIAGE (OUTPATIENT)
Dept: NURSING | Facility: CLINIC | Age: 63
End: 2023-08-14
Payer: COMMERCIAL

## 2023-08-14 DIAGNOSIS — U07.1 INFECTION DUE TO 2019 NOVEL CORONAVIRUS: Primary | ICD-10-CM

## 2023-08-14 NOTE — TELEPHONE ENCOUNTER
Became ill with URI symptoms  36 hrs ago with mild symptoms;  Covid positive today   Caller requesting  Paxlovid due to  pre existing condition  and age   Triage protocol and home care , isolation  protocol and red flag symptoms reviewed and understood;   Advised patient that  request will be routed to PCC for review   Patient further advised that  [facility] website offers telehealth encounter which is an option   Caller understands ;  requests routine to PCC and may consider  CODI AYALA           Reason for Disposition   [1] HIGH RISK for severe COVID complications (e.g., weak immune system, age > 64 years, obesity with BMI > 25, pregnant, chronic lung disease or other chronic medical condition) AND [2] COVID symptoms (e.g., cough, fever)  (Exceptions: Already seen by PCP and no new or worsening symptoms.)    Additional Information   Negative: SEVERE difficulty breathing (e.g., struggling for each breath, speaks in single words)   Negative: Difficult to awaken or acting confused (e.g., disoriented, slurred speech)   Negative: Bluish (or gray) lips or face now   Negative: Shock suspected (e.g., cold/pale/clammy skin, too weak to stand, low BP, rapid pulse)   Negative: Sounds like a life-threatening emergency to the triager   Negative: [1] Diagnosed or suspected COVID-19 AND [2] symptoms lasting 3 or more weeks   Negative: [1] COVID-19 exposure AND [2] no symptoms   Negative: COVID-19 vaccine reaction suspected (e.g., fever, headache, muscle aches) occurring 1 to 3 days after getting vaccine   Negative: COVID-19 vaccine, questions about   Negative: [1] Lives with someone known to have influenza (flu test positive) AND [2] flu-like symptoms (e.g., cough, runny nose, sore throat, SOB; with or without fever)   Negative: [1] Adult with possible COVID-19 symptoms AND [2] triager concerned about severity of symptoms or other causes   Negative: COVID-19 and breastfeeding, questions about   Negative:  SEVERE or constant chest pain or pressure  (Exception: Mild central chest pain, present only when coughing.)   Negative: MODERATE difficulty breathing (e.g., speaks in phrases, SOB even at rest, pulse 100-120)   Negative: [1] Headache AND [2] stiff neck (can't touch chin to chest)   Negative: Oxygen level (e.g., pulse oximetry) 90 percent or lower   Negative: Chest pain or pressure   Negative: Patient sounds very sick or weak to the triager   Negative: MILD difficulty breathing (e.g., minimal/no SOB at rest, SOB with walking, pulse <100)   Negative: Fever > 103 F (39.4 C)   Negative: [1] Fever > 101 F (38.3 C) AND [2] age > 60 years   Negative: [1] Fever > 100.0 F (37.8 C) AND [2] bedridden (e.g., nursing home patient, CVA, chronic illness, recovering from surgery)    Protocols used: Coronavirus (COVID-19) Diagnosed or [id number]

## 2023-08-15 ENCOUNTER — NURSE TRIAGE (OUTPATIENT)
Dept: NURSING | Facility: CLINIC | Age: 63
End: 2023-08-15
Payer: COMMERCIAL

## 2023-08-15 NOTE — TELEPHONE ENCOUNTER
Nurse Triage SBAR    Is this a 2nd Level Triage? YES, LICENSED PRACTITIONER REVIEW IS REQUIRED    Situation: Patient calling with questions about Paxlovid.    Background: Patient received script from MD and had questions about side effects. I attempted to assure patient that this medication is considered safe and effective. We reviewed multiple issues/concerns. Patient stated that she was going to take the medication due to her other health-related conditions. Patient asked that messaging be sent to be care team to advise that she has a script and nothing further needed from the clinic at this time.    Assessment: Info call.    Protocol Recommended Disposition:   Home Care    Recommendation: Advised patient that messaging to be sent to PCP/Care team for disposition input.     Routed to provider    Does the patient meet one of the following criteria for ADS visit consideration? 16+ years old, with an [facility] PCP     TIP  Providers, please consider if this condition is appropriate for management at one of our Acute and Diagnostic Services sites.     If patient is a good candidate, please use dotphrase <dot>triageresponse and select Refer to ADS to document.     Reason for Disposition   Information only question and nurse able to answer    Additional Information   Negative: Nursing judgment   Negative: Nursing judgment   Negative: Nursing judgment   Negative: Nursing judgment    Protocols used: Information Only Call - No Triage-A-OH    David Hobbs, RN, BSN, MSN  FNA Triage 1:19 PM

## 2023-08-16 DIAGNOSIS — U07.1 INFECTION DUE TO 2019 NOVEL CORONAVIRUS: Primary | ICD-10-CM

## 2023-10-07 ENCOUNTER — HEALTH MAINTENANCE LETTER (OUTPATIENT)
Age: 63
End: 2023-10-07

## 2023-10-15 ENCOUNTER — NURSE TRIAGE (OUTPATIENT)
Dept: NURSING | Facility: CLINIC | Age: 63
End: 2023-10-15
Payer: COMMERCIAL

## 2023-10-16 ENCOUNTER — TELEPHONE (OUTPATIENT)
Dept: ORTHOPEDICS | Facility: CLINIC | Age: 63
End: 2023-10-16
Payer: COMMERCIAL

## 2023-10-16 NOTE — TELEPHONE ENCOUNTER
See phone message from pt today 10-16-23.   Last seen in clinic 11- F/U Scoliosis & has routine RTN appt scheduled for 11-29-23.   I called pt back.  Pt stated had CT Chest done today 10-16-23 at [facility] for SOB & is now admitted to [facility] for treatment of New Bilat. PEs.  No designated Spine imaging completed per pt.    Pt wondered if CT Chest done would interfere or change our plan for EOS XR before 11-29-23 appt.  I told pt that it will not change our plan & wished pt the best with recovery from PEs.  Call back prn. Pt agreed.    Manuela Page RN.

## 2023-10-16 NOTE — TELEPHONE ENCOUNTER
[facility] Call Center    Phone Message    May a detailed message be left on voicemail: yes     Reason for Call: Other: Pt has questions regarding potential CT scan tmr along with her 2 yr imaging she will have with Rashmi on 11/29/23. Please call her back at [phone] to discuss      Action Taken: Other: [id number]    Travel Screening: Not Applicable

## 2023-10-16 NOTE — TELEPHONE ENCOUNTER
"2 days of fatigue    Easier to get SOB with activity    Body Aches    Mild Headaches    No Congestion    No Coughing    SOB has improved to a degree she no longer gets SOB while walking.     Protocol Recommendation is home care, I sent patient to scheduling to discuss with provider at earliest available appointment, if not virtual appointment.    Additional Information   Negative: SEVERE difficulty breathing (e.g., struggling for each breath, speaks in single words)   Negative: [1] Breathing stopped AND [2] hasn't returned   Negative: Choking on something   Negative: Bluish (or gray) lips or face now   Negative: Difficult to awaken or acting confused (e.g., disoriented, slurred speech)   Negative: Passed out (i.e., lost consciousness, collapsed and was not responding)   Negative: Wheezing started suddenly after medicine, an allergic food or bee sting   Negative: Stridor (harsh sound while breathing in)   Negative: Slow, shallow and weak breathing   Negative: Sounds like a life-threatening emergency to the triager   Negative: [1] MODERATE difficulty breathing (e.g., speaks in phrases, SOB even at rest, pulse 100-120) AND [2] NEW-onset or WORSE than normal   Negative: Oxygen level (e.g., pulse oximetry) 90 percent or lower   Negative: Wheezing can be heard across the room   Negative: Drooling or spitting out saliva (because can't swallow)   Negative: History of prior \"blood clot\" in leg or lungs (i.e., deep vein thrombosis, pulmonary embolism)   Negative: History of inherited increased risk of blood clots (e.g., Factor 5 Leiden, Anti-thrombin 3, Protein C or Protein S deficiency, Prothrombin mutation)   Negative: Major surgery in the past month   Negative: Hip or leg fracture (broken bone) in past month (or had cast on leg or ankle in past month)   Negative: Illness requiring prolonged bedrest in past month (e.g., immobilization, long hospital stay)   Negative: Long-distance travel in past month (e.g., car, bus, " "train, plane; with trip lasting 6 or more hours)   Negative: Cancer treatment in past six months (or has cancer now)   Negative: Extra heartbeats, irregular heart beating, or heart is beating very fast  (i.e., \"palpitations\")   Negative: Fever > 103 F (39.4 C)   Negative: [1] Fever > 101 F (38.3 C) AND [2] age > 60 years   Negative: [1] Fever > 100.0 F (37.8 C) AND [2] bedridden (e.g., CVA, chronic illness, recovering from surgery)   Negative: [1] Fever > 100.0 F (37.8 C) AND [2] diabetes mellitus or weak immune system (e.g., HIV positive, cancer chemo, splenectomy, organ transplant, chronic steroids)   Negative: [1] Periods where breathing stops and then resumes normally AND [2] bedridden (e.g., CVA)   Negative: Pregnant or postpartum (from 0 to 6 weeks after delivery)   Negative: Patient sounds very sick or weak to the triager   Negative: [1] MILD difficulty breathing (e.g., minimal/no SOB at rest, SOB with walking, pulse <100) AND [2] NEW-onset or WORSE than normal   Negative: [1] Longstanding difficulty breathing (e.g., CHF, COPD, emphysema) AND [2] WORSE than normal   Negative: [1] Longstanding difficulty breathing AND [2] not responding to usual therapy   Negative: Continuous (nonstop) coughing interferes with work, school, or sleeping   Negative: Oxygen level (e.g., pulse oximetry) 91 to 94 percent   Negative: [1] MODERATE longstanding difficulty breathing (e.g., speaks in phrases, SOB even at rest, pulse 100-120) AND [2] SAME as normal   Negative: [1] MILD longstanding difficulty breathing AND [2]  SAME as normal    Protocols used: Breathing Difficulty-A-AH    "

## 2023-10-26 ENCOUNTER — HOSPITAL ENCOUNTER (OUTPATIENT)
Dept: ULTRASOUND IMAGING | Facility: CLINIC | Age: 63
Discharge: HOME | End: 2023-10-26
Attending: NURSE PRACTITIONER | Admitting: NURSE PRACTITIONER
Payer: COMMERCIAL

## 2023-10-26 ENCOUNTER — OFFICE VISIT (OUTPATIENT)
Dept: INTERNAL MEDICINE | Facility: CLINIC | Age: 63
End: 2023-10-26
Payer: COMMERCIAL

## 2023-10-26 VITALS
HEART RATE: 69 BPM | BODY MASS INDEX: 25.37 KG/M2 | DIASTOLIC BLOOD PRESSURE: 76 MMHG | SYSTOLIC BLOOD PRESSURE: 116 MMHG | OXYGEN SATURATION: 97 % | WEIGHT: 139.6 LBS

## 2023-10-26 DIAGNOSIS — Z09 HOSPITAL DISCHARGE FOLLOW-UP: ICD-10-CM

## 2023-10-26 DIAGNOSIS — I26.99 BILATERAL PULMONARY EMBOLISM (H): ICD-10-CM

## 2023-10-26 DIAGNOSIS — Z12.31 ENCOUNTER FOR SCREENING MAMMOGRAM FOR BREAST CANCER: Primary | ICD-10-CM

## 2023-10-26 PROCEDURE — 99213 OFFICE O/P EST LOW 20 MIN: CPT | Performed by: NURSE PRACTITIONER

## 2023-10-26 PROCEDURE — 93970 EXTREMITY STUDY: CPT

## 2023-10-26 NOTE — PROGRESS NOTES
Latricia is a 63 year old that presents in clinic today for the following:     Chief Complaint   Patient presents with    Hospital F/U     Blood clots in lungs. Pt on ELIQUIS.           10/26/2023     4:03 PM   Additional Questions   Roomed by KTR       Screenings from encounters over the past 10 days    No data recorded       Scott Celeste, EMT at 4:07 PM on 10/26/2023

## 2023-10-26 NOTE — PROGRESS NOTES
"S: Latricia is a 63 year old female who presents for hospital follow up.     She was admitted to [facility] for observation from 10/16-10/17 for bilateral pulmonary embolisms. Was discharged home with PO eliquis. ECHO completed which showed normal LV and RV size and function and no valve abnormalities.     Today she overall feels better. Has no chest pain, but still has a little bit of shortness of breath. Fatigue has improved.  Denies fevers, chills, night sweats.   Forgot to switch to 5mg of Eliquis on Tuesday so has taken 10mg BID for 9 days instead of 7 days.  Has had intermittent pain like symptoms in her right leg since being home. Describes it as tightness. Feels it when she is sitting down  No lower leg ultrasounds were ever completed.     Had COVID mid August.   2 weeks prior to presenting to [facility] she was more sedentary and rode in the car more than usual.   20 + years ago had a \"clot\" in her leg but was told it was not worrisome and was never started on meds  No family history of cancers. Family history of RA and maybe lupus.   Hx of hysterectomy, still has ovaries.   UTD on colonoscopy.   Overdue for mammogram.   Has hx of scoliosis- sees orthopedics. No new back pain, numbness or tingling down legs, loss of bowel/bladder control.            Patient Active Problem List   Diagnosis    Thoracogenic scoliosis of thoracolumbar region    Osteopenia of multiple sites          Past Medical History:   Diagnosis Date    Anemia     Arthritis     Fibroids     History of blood transfusion     Radon exposure     Scoliosis     Scoliosis of thoracic spine, unspecified scoliosis           Past Surgical History:   Procedure Laterality Date    COLONOSCOPY  2020    GYN SURGERY  2007    hysterectomy    HYSTERECTOMY  01/01/2007    fibroids    TONSILLECTOMY            Last colon cancer screen: 2020, recommended 10 year follow up    Last mammogram: Due, order placed today    Last pap: Hysterectomy in 2007: still has " ovaries     Last Dexa scan:          Social History     Tobacco Use    Smoking status: Former     Packs/day: .25     Types: Cigarettes     Quit date: 2021     Years since quittin.3    Smokeless tobacco: Never    Tobacco comments:     occ smoker   Substance Use Topics    Alcohol use: Yes     Comment: about 2 drinks per month.            Family History   Problem Relation Age of Onset    Cirrhosis Father     Heart Disease Father     Coronary Artery Disease Father     Hyperlipidemia Father     Depression Mother     Bipolar Disorder Mother     Rheumatoid Arthritis Mother     Mental Illness Mother     Osteoporosis Mother     Thyroid Disease Mother     Hyperlipidemia Brother     Thyroid Disease Sister     Depression Sister     Osteoporosis Paternal Grandmother     Coronary Artery Disease Paternal Grandmother     Coronary Artery Disease Paternal Grandfather     Osteoarthritis Other     Rashes/Skin Problems Maternal Aunt     Rheumatoid Arthritis Maternal Aunt     Cirrhosis Maternal Aunt     Rheumatoid Arthritis Maternal Aunt     Rheumatoid Arthritis Maternal Aunt     Depression Maternal Aunt     Rheumatoid Arthritis Maternal Aunt     Depression Maternal Aunt     Rheumatoid Arthritis Maternal Aunt     Hyperlipidemia Brother     Depression Sister     Thyroid Disease Sister              No Known Allergies         Current Outpatient Medications   Medication Sig Dispense Refill    apixaban ANTICOAGULANT (ELIQUIS) 5 MG tablet Take 5 mg by mouth 2 times daily      Calcium-Vitamin D-Vitamin K (CALCIUM + D + K PO) Take 1 tablet by mouth daily      MODERNA COVID-19 VACCINE 100 MCG/0.5ML injection       Multiple Vitamin (MULTI-VITAMIN DAILY PO)       nirmatrelvir and ritonavir (PAXLOVID) 300 mg/100 mg therapy pack Take 3 tablets by mouth 2 times daily 30 tablet 0    TURMERIC CURCUMIN PO Take 1 tablet by mouth every other day         REVIEW OF SYSTEMS:  See above.    O:   /76 (BP Location: Right arm, Patient Position:  Sitting, Cuff Size: Adult Regular)   Pulse 69   Wt 63.3 kg (139 lb 9.6 oz)   SpO2 97%   BMI 25.37 kg/m    GENERAL APPEARANCE: alert and no distress  EYES: EOMI,  PERRL, sclera white, conjunctiva normal.  RESP: lungs clear to auscultation - no rales, rhonchi or wheezes  CV: regular rates and rhythm, normal S1 S2, no S3 or S4 and no murmur, click or rub. No pain with    NEURO: alert and oriented.  Good historian.  MUSK: ambulatory.  SKIN: warm, well perfused  EXT: warm.  Edema NO   PSYCHE: calm, appropriate to situation     The 10-year ASCVD risk score (Anup KAISER, et al., 2019) is: 2.6%    Values used to calculate the score:      Age: 63 years      Sex: Female      Is Non- : No      Diabetic: No      Tobacco smoker: No      Systolic Blood Pressure: 116 mmHg      Is BP treated: No      HDL Cholesterol: 73 mg/dL      Total Cholesterol: 207 mg/dL    A/P:  Latricia was seen today for hospital f/u.    Diagnoses and all orders for this visit:    Hospital discharge follow-up    Encounter for screening mammogram for breast cancer  -     MA Screening Digital Bilateral; Future    Bilateral pulmonary embolism (H)  -     Adult Oncology/Hematology  Referral; Future  -     US Lower Extremity Venous Duplex Bilateral; Future  -     Will obtain lower extremity duplex US as they did not obtain them at [facility] and she is having some mild symptoms in her right leg. Could be some venous insufficiency.     Patient doing well today. Discussed to start taking the lower dose 5mg tonight. Patient does not have obvious malignancy red flag symptoms: no fever, weight loss, new back pain, night sweats, no FH cancer, no personal history of cancer. She is UTD on colon cancer screening, has a history of a hysterectomy. Discussed she should get mammogram as soon as she can before seeing hematology. Appreciate hematology input, will defer diagnostic laboratory to them.      Patient should follow up if she  experiences any worsening shortness of breath, new onset fatigue, chest pain, or fever.     The patient voiced understanding of the information discussed and all questions were answered.     Aditi Xavier NP Student     I spent a total of 25 minutes in the care of this pt during today's office visit. This time includes reviewing the patient's chart and prior history, obtaining a history, performing an examination and evaluation and counseling the patient. This time also includes ordering medications or tests necessary in addition to communication to other member's of the patient's health care team. Time spent in documentation and care coordination is included.     I saw the patient with the student Aditi FONSECA student who acted as a scribe.The PMH,SOCHX, FAMHX, were reviewed by me / My exam and recommendations were performed and described in this note.       Mya MARTÍNEZ, CNP

## 2023-10-27 ENCOUNTER — HOSPITAL ENCOUNTER (OUTPATIENT)
Dept: MAMMOGRAPHY | Facility: CLINIC | Age: 63
Discharge: HOME | End: 2023-10-27
Attending: NURSE PRACTITIONER | Admitting: NURSE PRACTITIONER
Payer: COMMERCIAL

## 2023-10-27 DIAGNOSIS — Z12.31 ENCOUNTER FOR SCREENING MAMMOGRAM FOR BREAST CANCER: ICD-10-CM

## 2023-10-27 PROCEDURE — 77067 SCR MAMMO BI INCL CAD: CPT

## 2023-11-27 DIAGNOSIS — M41.35 THORACOGENIC SCOLIOSIS OF THORACOLUMBAR REGION: Primary | ICD-10-CM

## 2023-11-29 ENCOUNTER — ANCILLARY PROCEDURE (OUTPATIENT)
Dept: GENERAL RADIOLOGY | Facility: CLINIC | Age: 63
End: 2023-11-29
Attending: ORTHOPAEDIC SURGERY
Payer: COMMERCIAL

## 2023-11-29 ENCOUNTER — OFFICE VISIT (OUTPATIENT)
Dept: ORTHOPEDICS | Facility: CLINIC | Age: 63
End: 2023-11-29
Payer: COMMERCIAL

## 2023-11-29 VITALS — WEIGHT: 142 LBS | BODY MASS INDEX: 26.13 KG/M2 | HEIGHT: 62 IN

## 2023-11-29 DIAGNOSIS — M41.125 ADOLESCENT IDIOPATHIC SCOLIOSIS OF THORACOLUMBAR REGION: Primary | ICD-10-CM

## 2023-11-29 DIAGNOSIS — M41.35 THORACOGENIC SCOLIOSIS OF THORACOLUMBAR REGION: ICD-10-CM

## 2023-11-29 PROCEDURE — 99214 OFFICE O/P EST MOD 30 MIN: CPT | Performed by: ORTHOPAEDIC SURGERY

## 2023-11-29 PROCEDURE — 77073 BONE LENGTH STUDIES: CPT | Performed by: STUDENT IN AN ORGANIZED HEALTH CARE EDUCATION/TRAINING PROGRAM

## 2023-11-29 PROCEDURE — 72082 X-RAY EXAM ENTIRE SPI 2/3 VW: CPT | Performed by: STUDENT IN AN ORGANIZED HEALTH CARE EDUCATION/TRAINING PROGRAM

## 2023-11-29 NOTE — LETTER
11/29/2023         RE: Latricia Epperson  [address]  [place] [address]        Dear Colleague,    Thank you for referring your patient, Latricia Epperson, to the [facility] [place]. Please see a copy of my visit note below.    She returns for her every 2-year follow-up for her adult scoliosis.  She has been doing Schroth technique for a number of years.  I been seeing her since 2017.  She currently can stand 15 to 20 minutes with walking poles she can walk 4 miles without she can walk 1/2-3/4 of a mile.  Recently she had bilateral pulmonary embolism felt to be associated with COVID and is currently on Eliquis.  Current visual analog pain scale is a 3 Oswestry score is 44%.    Objective: Physical exam shows an appropriately developed alert female in no acute distress today.    Imaging: AP and lateral EOS was obtained today.  Comparison is made to prior imaging and the patient specifically wanted to see compared back to her initial imaging in 2017.  These images and comparison are attached.  Also reviewed today are her DEXA images.  Her hip and wrists are in the osteopenia normal bone density range.  Her spine which has significant scoliosis is markedly osteoporotic.  I explained that DEXA is not valid when there is scoliosis and that CT is better.        Assessment: Adult thoracolumbar scoliosis nonprogressive and apparently responsive to Schroth therapy.    Plan: At this point surgery is not appropriate or necessary.  Routine follow-up in 2 years would probably be appropriate left Latricia had a series of questions that she wanted to go through and we reviewed these today.  We talked about follow-on care and I provided information for her.  Will also give her a referral to an Frett here in [facility] who does Schroth technique for adults.    Total contact time for this visit including image ordering, image review, face-to-face evaluation, documentation was greater than 30  minutes.        Ja Caraballo MD

## 2023-11-29 NOTE — PROGRESS NOTES
She returns for her every 2-year follow-up for her adult scoliosis.  She has been doing Schroth technique for a number of years.  I been seeing her since 2017.  She currently can stand 15 to 20 minutes with walking poles she can walk 4 miles without she can walk 1/2-3/4 of a mile.  Recently she had bilateral pulmonary embolism felt to be associated with COVID and is currently on Eliquis.  Current visual analog pain scale is a 3 Oswestry score is 44%.    Objective: Physical exam shows an appropriately developed alert female in no acute distress today.    Imaging: AP and lateral EOS was obtained today.  Comparison is made to prior imaging and the patient specifically wanted to see compared back to her initial imaging in 2017.  These images and comparison are attached.  Also reviewed today are her DEXA images.  Her hip and wrists are in the osteopenia normal bone density range.  Her spine which has significant scoliosis is markedly osteoporotic.  I explained that DEXA is not valid when there is scoliosis and that CT is better.        Assessment: Adult thoracolumbar scoliosis nonprogressive and apparently responsive to Schroth therapy.    Plan: At this point surgery is not appropriate or necessary.  Routine follow-up in 2 years would probably be appropriate left Latricia had a series of questions that she wanted to go through and we reviewed these today.  We talked about follow-on care and I provided information for her.  Will also give her a referral to an Frett here in [facility] who does Schroth technique for adults.    Total contact time for this visit including image ordering, image review, face-to-face evaluation, documentation was greater than 30 minutes.

## 2023-11-29 NOTE — NURSING NOTE
"Reason For Visit:   Chief Complaint   Patient presents with    RECHECK     F/U scoliosis, thoracolumbar region, Last seen 11-.       Primary MD: Dr. Flor Atkins  Ref. MD: EST    ?  No     Occupation Retired/ Disability     Date of injury: no  Type of injury: no.     Date of surgery: no  Type of surgery: no.     Smoker: No  Request smoking cessation information: No    Ht 1.573 m (5' 1.93\")   Wt 64.4 kg (142 lb)   BMI 26.03 kg/m      Pain Assessment  Patient Currently in Pain: Yes  0-10 Pain Scale: 3  Primary Pain Location: Back    SRS 53.3%    Oswestry (SUZIE) Questionnaire        11/28/2023     5:06 PM   OSWESTRY DISABILITY INDEX   Count 10   Sum 22   Oswestry Score (%) 44 %        Visual Analog Pain Scale  Back Pain Scale 0-10: 2.5  Right leg pain: 0  Left leg pain: 0  Neck Pain Scale 0-10: 0  Right arm pain: 0  Left arm pain: 0    Promis 10 Assessment        11/28/2023     9:58 PM   PROMIS 10   In general, would you say your health is: Good   In general, would you say your quality of life is: Good   In general, how would you rate your physical health? Fair   In general, how would you rate your mental health, including your mood and your ability to think? Very good   In general, how would you rate your satisfaction with your social activities and relationships? Good   In general, please rate how well you carry out your usual social activities and roles Fair   To what extent are you able to carry out your everyday physical activities such as walking, climbing stairs, carrying groceries, or moving a chair? Moderately   In the past 7 days, how often have you been bothered by emotional problems such as feeling anxious, depressed, or irritable? Rarely   In the past 7 days, how would you rate your fatigue on average? Mild   In the past 7 days, how would you rate your pain on average, where 0 means no pain, and 10 means worst imaginable pain? 5   In general, would you say your health is: 3   In general, " would you say your quality of life is: 3   In general, how would you rate your physical health? 2   In general, how would you rate your mental health, including your mood and your ability to think? 4   In general, how would you rate your satisfaction with your social activities and relationships? 3   In general, please rate how well you carry out your usual social activities and roles. (This includes activities at home, at work and in your community, and responsibilities as a parent, child, spouse, employee, friend, etc.) 2   To what extent are you able to carry out your everyday physical activities such as walking, climbing stairs, carrying groceries, or moving a chair? 3   In the past 7 days, how often have you been bothered by emotional problems such as feeling anxious, depressed, or irritable? 2   In the past 7 days, how would you rate your fatigue on average? 2   In the past 7 days, how would you rate your pain on average, where 0 means no pain, and 10 means worst imaginable pain? 5   Global Mental Health Score 14   Global Physical Health Score 12   PROMIS TOTAL - SUBSCORES 26                Traci Orozco LPN

## 2023-11-30 ENCOUNTER — OFFICE VISIT (OUTPATIENT)
Dept: HEMATOLOGY | Facility: CLINIC | Age: 63
End: 2023-11-30
Attending: NURSE PRACTITIONER
Payer: COMMERCIAL

## 2023-11-30 VITALS
DIASTOLIC BLOOD PRESSURE: 75 MMHG | TEMPERATURE: 98.2 F | WEIGHT: 142.8 LBS | SYSTOLIC BLOOD PRESSURE: 114 MMHG | HEIGHT: 62 IN | HEART RATE: 71 BPM | OXYGEN SATURATION: 100 % | BODY MASS INDEX: 26.28 KG/M2

## 2023-11-30 DIAGNOSIS — I26.99 BILATERAL PULMONARY EMBOLISM (H): ICD-10-CM

## 2023-11-30 LAB
FACTOR 2 INTERPRETATION: NORMAL
FACTOR V INTERPRETATION: NORMAL
LAB DIRECTOR COMMENTS: NORMAL
LAB DIRECTOR DISCLAIMER: NORMAL
LAB DIRECTOR INTERPRETATION: NORMAL
LAB DIRECTOR METHODOLOGY: NORMAL
LAB DIRECTOR RESULTS: NORMAL
SPECIMEN DESCRIPTION: NORMAL

## 2023-11-30 PROCEDURE — 86147 CARDIOLIPIN ANTIBODY EA IG: CPT | Performed by: INTERNAL MEDICINE

## 2023-11-30 PROCEDURE — 85306 CLOT INHIBIT PROT S FREE: CPT | Performed by: INTERNAL MEDICINE

## 2023-11-30 PROCEDURE — 99213 OFFICE O/P EST LOW 20 MIN: CPT | Performed by: INTERNAL MEDICINE

## 2023-11-30 PROCEDURE — 85303 CLOT INHIBIT PROT C ACTIVITY: CPT | Performed by: INTERNAL MEDICINE

## 2023-11-30 PROCEDURE — 86146 BETA-2 GLYCOPROTEIN ANTIBODY: CPT | Performed by: INTERNAL MEDICINE

## 2023-11-30 PROCEDURE — 81240 F2 GENE: CPT | Performed by: INTERNAL MEDICINE

## 2023-11-30 PROCEDURE — G0452 MOLECULAR PATHOLOGY INTERPR: HCPCS | Mod: 26 | Performed by: PATHOLOGY

## 2023-11-30 PROCEDURE — 36415 COLL VENOUS BLD VENIPUNCTURE: CPT | Performed by: INTERNAL MEDICINE

## 2023-11-30 PROCEDURE — 99205 OFFICE O/P NEW HI 60 MIN: CPT | Performed by: INTERNAL MEDICINE

## 2023-11-30 PROCEDURE — 85300 ANTITHROMBIN III ACTIVITY: CPT | Performed by: INTERNAL MEDICINE

## 2023-11-30 NOTE — PROGRESS NOTES
[facility]  [address] [place] [address]  Phone: [phone], Fax: [phone]    Outpatient Visit Note:    Patient: Latricia Epperson  MRN: [id number]  : 1960  MISAEL: 2023     Reason for Consultation:  history of bilateral PE, remote history of superficial thrombophlebitis; anticoagulation management    Assessment: Latricia Epperson is a 63 year old woman with minimally provoked bilateral pulmonary embolism in 2023 who is likely at lower for recurrence by Her-DOO2 score, however will be tested for APS and inherited thrombophilia for further risk stratification. We will discuss ongoing anticoagulation in 6 months.     Based on her history, Denices pulmonary embolism may have been contributed by weak risk factors of prolonged travel and nicotine use, however she does not have any major risk factors for VTE. There is certainly a possibility that Latricia's pulmonary embolism was provoked by travel, however also not sufficient enough risk factors to say that her pulmonary embolism was purely provoked. Latricia is currently in the process of deciding if she would like to remain on anticoagulation indefinitely or discontinue treatment after 6 months. We reviewed the overall risks and benefits of each treatment option and compared the risks and benefits of warfarin vs DOAC therapy. We discussed the potential to only take anticoagulation during periods of provoking factors such as hospitalization, postoperatively and on travel days. Interestingly, Latricia's family does have a strong family history of autoimmune disease and I recommend that we screen for APS today. We also discussed the use of genetic screening to identify for inherited thrombophilia. Although Latricia is unsure of her family's history of VTE she does think knowing about an inherited thrombophilia will help her understand her risk and aid in treatment decision making. Latricia is going to consider  her options for treatment while we wait for laboratory workup to return. We also discussed that she is able to change her mind at any point in regards to her therapy plan.     Recommendations:  Continue Apixaban until May 2024 constituting 6 months of therapy. During this time she may transition to Xarelto for the benefit of once per day dosing and she will discuss with pharmacy today about medication obtainment options.   We will test for APS today. If her testing returns positive I will send instructions for repeat testing in 3 months for disease confirmation.   We will proceed with gene mutation testing for Factor 2, 5, Prothrombin [id number] mutation, Protein C and Protein S deficiencies. [facility]   Latricia will follow up in 5 months to discuss ongoing anticoagulation plan. At this time we will discuss discontinuing anticoagulation altogether, taking anticoagulation only during known provoking factors, or indefinite anticoagulation.       Physician Attestation   I saw this patient with the student who acted as my scribe for the visit.  I have edited the note to reflect my history, exam and medical decision making.    APS testing today is negative.   Latest Reference Range & Units 11/30/23 12:58   Beta 2 Glycoprotein 1 Antibody IgG <7.0 U/mL 1.7   Beta 2 Glycoprotein 1 Antibody IgM <7.0 U/mL <2.4      Latest Reference Range & Units 11/30/23 12:58   Cardiolipin Felecia IgG Instrument Value <10.0 GPL-U/mL 5.9   Cardiolipin Felecia IgM Instrument Value <10.0 MPL-U/mL 2.2     Inherited thrombophilia evaluation is also negative (AT, PC and PS all normal, no mutations in FV or F2)    RTC at the end of treatment     60 minutes spent by me on the date of the encounter doing chart review, review of test results, interpretation of tests, patient visit, and documentation     Leo Dhaliwal MD   of Medicine  [employer]  "  -------------------------------  History: Latricia Epperson is a 63 year old woman with a history of pulmonary embolism in 10/2023 and history of superficial thrombophlebitis 20 years ago. She presented to the urgency room in [place] with complaints of progressive shortness of breath with exertion, fatigue, and pleuritic chest pain. Symptoms had been present for 5 days. Prior to the event she reports making several car trips >4 hours, smoking several cigarettes per day but otherwise denied HRT, recent hospitalization, or surgical procedures. CTPA demonstrated extensive acute bilateral pulmonary emboli as described. Evidence of right heart strain. Probable small pulmonary infarction within the inferior aspect the right middle lobe. Small right pleural effusion with adjacent atelectasis and/or additional small pulmonary infarctions within the periphery of the right lower lobe. She was transferred to [facility] and noted to be hemodynamically stable and started on a heparin drip. She was discharged on anticoagulation with apixaban 10 mg BID x 7 days followed by apixaban 5 mg BID thereafter. She underwent doppler Us of lower extremities on 10/26/23 which did not reveal any DVT. In regards to her superficial thrombophlebitis she reports developing lower extremity swelling and pain. She was diagnosed with a \"superficial blood clot\" and her symptoms went away without any therapy. She denies any injury or trauma at that time. She was referred to hematology for anticoagulation management.     Today, Latricia reports that since starting anticoagulation in the hospital her symptoms are greatly improved and she is feeling improved from her baseline. She has even stopped smoking altogether. She has continued on apixaban 5 mg BID but does admit to missing doses occasionally because she does not take any other medications. She reports some minor bleeding in her finger nail beds but denies any significant bleeding events. She " "would be interested in once daily dosing of her medication. She is undecided on whether to pursue indefinite anticoagulation and wants to explore any possible reason her PE happened.     Medical history is notable for osteoporosis, scoliosis, anemia, fibroids, radon exposure    Surgical history is reviewed in the EMR and is notable for hysterectomy, 2007, tonsillectomy, colonoscopy    Family history is notable for mother and four maternal aunts with rheumatoid arthritis; mother and two sisters with thyroid disease; father with cirrhosis, HLD, CAD; no family history of VTE    Current Outpatient Medications   Medication    apixaban ANTICOAGULANT (ELIQUIS) 5 MG tablet    Calcium-Vitamin D-Vitamin K (CALCIUM + D + K PO)    MODERNA COVID-19 VACCINE 100 MCG/0.5ML injection    Multiple Vitamin (MULTI-VITAMIN DAILY PO)    nirmatrelvir and ritonavir (PAXLOVID) 300 mg/100 mg therapy pack    TURMERIC CURCUMIN PO     No current facility-administered medications for this visit.       Physical Exam:  Vitals: /75   Pulse 71   Temp 98.2  F (36.8  C) (Oral)   Ht 1.575 m (5' 2\")   Wt 64.8 kg (142 lb 12.8 oz)   SpO2 100%   BMI 26.12 kg/m     Wt: 142 lbs 12.8 oz Body mass index is 26.12 kg/m .   Exam:   Gen: Appears well, no distress  HEENT: no scleral icterus or hemorrhage, no wet purpura, no lymphadenopathy  Ext: no edema  Skin: no ecchymoses or hematomas  Neuro: no focal deficits, affect and cognition are normal    Labs:  N/a      Imaging:  EXAM: CTA CHEST   LOCATION: [facility]   DATE: 10/16/2023     INDICATION: Chest pain. Elevated d-dimer.   COMPARISON: None.   TECHNIQUE: CT chest pulmonary angiogram during arterial phase injection of IV contrast. Multiplanar reformats and MIP reconstructions were performed. Dose reduction techniques were used.   CONTRAST: IOPAMIDOL 300 MG/ML  ML BOTTLE: 100mL     FINDINGS:     ANGIOGRAM CHEST: Pulmonary arteries are normal caliber. Extensive acute bilateral " pulmonary emboli involving lobar, segmental, and subsegmental branches within the right lower lobe; lobar, segmental, and subsegmental branches in the right middle lobe; segmental and subsegmental branches within the right upper lobe; lobar and segmental branches in the left upper lobe; and lobar, segmental, and subsegmental branches in the left lower lobe. Evidence of right heart strain, including straightening of the interventricular septum and an elevated RV:LV ratio. Thoracic aorta is normal in caliber and negative for dissection.     LUNGS AND PLEURA: Small right pleural effusion with adjacent atelectasis. Groundglass opacity within the inferior periphery of right middle lobe is likely a small pulmonary infarction. Additional smaller groundglass opacities within the periphery of the right lower lobe could either be small infarctions or atelectasis. No pneumothorax.     MEDIASTINUM/AXILLAE: Normal cardiac size. Small pericardial effusion. Small hiatal hernia. No enlarged thoracic lymph node.     CORONARY ARTERY CALCIFICATION: None.     UPPER ABDOMEN: No significant abnormalities.     MUSCULOSKELETAL: Severe thoracolumbar scoliosis with multilevel spinal degenerative changes. No acute bony abnormality.

## 2023-12-01 LAB
AT III ACT/NOR PPP CHRO: 120 % (ref 85–135)
B2 GLYCOPROT1 IGG SERPL IA-ACNC: 1.7 U/ML
B2 GLYCOPROT1 IGM SERPL IA-ACNC: <2.4 U/ML
CARDIOLIPIN IGG SER IA-ACNC: 5.9 GPL-U/ML
CARDIOLIPIN IGG SER IA-ACNC: NEGATIVE
CARDIOLIPIN IGM SER IA-ACNC: 2.2 MPL-U/ML
CARDIOLIPIN IGM SER IA-ACNC: NEGATIVE

## 2023-12-04 LAB
PROT C ACT/NOR PPP CHRO: 106 % (ref 70–170)
PROT S FREE AG ACT/NOR PPP IA: 73 % (ref 55–125)

## 2024-01-19 ENCOUNTER — DOCUMENTATION ONLY (OUTPATIENT)
Dept: ORTHOPEDICS | Facility: CLINIC | Age: 64
End: 2024-01-19
Payer: COMMERCIAL

## 2024-01-19 NOTE — PROGRESS NOTES
Received Completed forms Yes   Faxed Forms Faxed To: [facility]  Fax Number: [phone]   Sent to HIM (Date) 1/19/24

## 2024-06-07 NOTE — PROGRESS NOTES
Latricia is a 63 year old who is being evaluated via a billable video visit.        Video-Visit Details    Type of service:  Video Visit   Start:  11:33 am  Stop:11:59 am  Originating Location (pt. Location): Home    Distant Location (provider location):  On-site  Platform used for Video Visit: Gutierrez  Signed Electronically by: Soheila Swann MD       [facility] Consult    Reasons for Consult: -Bilateral pulmonary embolism October 2023, minimally provoked    History of Present Illness: Ms. Epperson is a 63-year-old woman with a history of bilateral pulmonary embolism October 2023, minimally provoked by prolonged travel and cigarette smoking.  She had had several car trips greater than 4 hours.  No other provoking factors.  Leg ultrasound was negative for DVT.  She was negative for beta-2 glycoprotein 1 and cardiolipin antibodies.  She was treated with apixaban 10 mg twice daily x 7 days, followed by 5 mg twice daily.  She was seen by my colleague, , who recommended continuing anticoagulation for 6 months and to also have some familial thrombophilia testing.  Protein C, free protein S, Antithrombin activity were all normal.  Negative factor V Leiden and prothrombin gene mutation.    Today she is feeling well.  She discussed how she is really been thinking about her symptoms prior to her pulmonary embolism.  She did have COVID at the end of August.  She was treated with Paxlovid, did not require hospitalization.  She reports that a short while after that she did start having some shortness of breath and pleuritic chest pain that gradually worsened until she was diagnosed with her PE.  So she is wondering if the COVID is related.  No shortness of breath or chest pain today.  She is on the apixaban 5 mg twice daily.  No epistaxis, melena, hematochezia, hematuria, large bruising.  She has occasional mild swelling in her ankles, happens only a couple of times a year.  No chronic  stasis changes.    Past Medical History:  - PE 2023  `Anemia  - Scoliosis  - Uterine fibroids, status post hysterectomy 2007  -distant h/o thrombophlebititis  decades ago     Medications:  - Reviewed    Family History:  no family history of DVT/PE    Social History: smoking-intermittent over the years, has quit completely.  Alcohol-2 to 4 glasses of wine per month.  No marijuana.    Review of systems:  As in HPI.  The rest of the > 10 point review of systems was negative.      Physical exam:    General appearance:  Patient is 63 year old woman in no acute distress.     HEENT:  No pallor, icterus.   Lungs: Normal respirations, no cough or audible wheezes.    Skin:  No rash, no petechiae or ecchymoses.      Labs:   Latest Reference Range & Units 11/30/23 12:58   Cardiolipin IgG Felecia Negative  Negative   Cardiolipin IgM Felecia Negative  Negative   Cardiolipin Felecia IgG Instrument Value <10.0 GPL-U/mL 5.9   Cardiolipin Felecia IgM Instrument Value <10.0 MPL-U/mL 2.2      Latest Reference Range & Units 11/30/23 12:58   Antithrombin III Chromogenic 85 - 135 % 120   Prot C Chromogenic 70 - 170 % 106   Protein S Antigen Free 55 - 125 % 73     INTERPRETATION    The patient is negative for the Factor V 1691G>A (Leiden) and negative for the Factor 2 mutation.  (Electronically signed by: Ion Hannon MD December 5, 2023 9:29 AM)     Assessment and Recommendation: -    # PE- possibly provoked by COVID  -Stop apixaban   -D-dimer in ~ 2 weeks  -If significantly positive, go back on apixaban at prophylactic dose of 2.5 mg twice daily, if D-dimer is negative, stay off apixaban  - For prolonged air travel, for example going to [place] in August, take 1 apixaban tablet 2 hours prior to the plane trip each direction.    I will be in touch with her regarding results and whether she needs any follow-up.  Time: I spent a total of 45 minutes on the day of the visit. Please see the note for further information on patient assessment and treatment.      Soheila Swann MD  Hematology

## 2024-06-10 ENCOUNTER — VIRTUAL VISIT (OUTPATIENT)
Dept: HEMATOLOGY | Facility: CLINIC | Age: 64
End: 2024-06-10
Attending: INTERNAL MEDICINE
Payer: COMMERCIAL

## 2024-06-10 DIAGNOSIS — I26.99 BILATERAL PULMONARY EMBOLISM (H): Primary | ICD-10-CM

## 2024-06-10 PROCEDURE — 99215 OFFICE O/P EST HI 40 MIN: CPT | Mod: 95 | Performed by: INTERNAL MEDICINE

## 2024-06-10 NOTE — PATIENT INSTRUCTIONS
- Go to [facility] in about 2 weeks to have D-dimer checked  - I will be in touch regarding results.  If the D-dimer is significantly elevated, I will have you go back on the Eliquis at 2.5 mg twice daily.  If the D-dimer is normal or only slightly elevated, then you can stay off of the Eliquis.  - For your plane trip to [place], if you are are not on daily Eliquis, then I would recommend taking one of your 5 mg tablets 2 hours before the flight, in each direction.

## 2024-06-10 NOTE — PROGRESS NOTES
Patient was contacted to complete the pre-visit call prior to their telephone visit with the provider.     Allergies and medications were reviewed.     I thanked them for their time to cover this information.     Rachana Soto MA

## 2024-06-26 ENCOUNTER — LAB (OUTPATIENT)
Dept: LAB | Facility: CLINIC | Age: 64
End: 2024-06-26
Payer: COMMERCIAL

## 2024-06-26 DIAGNOSIS — I26.99 BILATERAL PULMONARY EMBOLISM (H): ICD-10-CM

## 2024-06-26 LAB — D DIMER PPP FEU-MCNC: 0.42 UG/ML FEU (ref 0–0.5)

## 2024-06-26 PROCEDURE — 36415 COLL VENOUS BLD VENIPUNCTURE: CPT

## 2024-06-26 PROCEDURE — 85379 FIBRIN DEGRADATION QUANT: CPT

## 2024-06-27 NOTE — RESULT ENCOUNTER NOTE
Hello -    Here are my comments about the recent results: Good news. The d-dimer in normal. OK to remain off of apixaban (Eliquis). Contact [facility] if questions.     Regards,   Soheila Swann MD

## 2024-07-03 DIAGNOSIS — M41.35 THORACOGENIC SCOLIOSIS OF THORACOLUMBAR REGION: Primary | ICD-10-CM

## 2024-07-07 DIAGNOSIS — Z86.711 HISTORY OF PULMONARY EMBOLISM: Primary | ICD-10-CM

## 2024-07-24 NOTE — TELEPHONE ENCOUNTER
Action July 24, 2024 2:38 PM MT   Action Taken Called [facility], tt: [facility] radiology team for imaging to be pushed STAT.       DIAGNOSIS:   Adolescent idiopathic scoliosis of thoracolumbar region.  Thoracogenic scoliosis of thoracolumbar region.     APPOINTMENT DATE: 07/25/2024   NOTES STATUS DETAILS   OFFICE NOTE from referring provider Internal 11/29/2023 - Ja Caraballo MD - [facility]   DEXA PACS Internal   CT SCAN PACS [facility]:  10/16/2023 - Chest   XRAYS (IMAGES & REPORTS) PACS Internal

## 2024-07-25 ENCOUNTER — OFFICE VISIT (OUTPATIENT)
Dept: ORTHOPEDICS | Facility: CLINIC | Age: 64
End: 2024-07-25
Payer: COMMERCIAL

## 2024-07-25 ENCOUNTER — PRE VISIT (OUTPATIENT)
Dept: ORTHOPEDICS | Facility: CLINIC | Age: 64
End: 2024-07-25

## 2024-07-25 VITALS — HEIGHT: 62 IN | WEIGHT: 144 LBS | BODY MASS INDEX: 26.5 KG/M2

## 2024-07-25 DIAGNOSIS — M85.89 OSTEOPENIA OF MULTIPLE SITES: ICD-10-CM

## 2024-07-25 DIAGNOSIS — M41.125 ADOLESCENT IDIOPATHIC SCOLIOSIS OF THORACOLUMBAR REGION: ICD-10-CM

## 2024-07-25 DIAGNOSIS — M41.35 THORACOGENIC SCOLIOSIS OF THORACOLUMBAR REGION: Primary | ICD-10-CM

## 2024-07-25 PROCEDURE — 99214 OFFICE O/P EST MOD 30 MIN: CPT | Performed by: STUDENT IN AN ORGANIZED HEALTH CARE EDUCATION/TRAINING PROGRAM

## 2024-07-25 NOTE — PROGRESS NOTES
In-Person Visit    REASON FOR CONSULTATION: AIS   REFERRING PHYSICIAN: Ja Caraballo  PCP:Flor Atkins    History of Present Illness:  64 year old female, who has been followed with Dr. Caraballo for some time, referred to establish care as a new patient today. She has been doing well with conservative measures including occasional NSAIDs and continued PT/Schroth exercises. Denies any significant pain, weakness or radicular symptoms at this time.     Last Visit Date: 11/29/23 (c/o Dr. Ja Caraballo)  Previous Impression:  Adult thoracolumbar scoliosis nonprogressive and apparently responsive to Schroth therapy.   Previous Plan:  At this point surgery is not appropriate or necessary. Routine follow-up in 2 years would probably be appropriate left Latricia had a series of questions that she wanted to go through and we reviewed these today. We talked about follow-on care and I provided information for her. Will also give her a referral to an [facility] here in [facility] who does Schroth technique for adults.     Oswestry (SUZIE) Questionnaire        7/25/2024    11:02 AM   OSWESTRY DISABILITY INDEX   Count 5   Sum 11   Oswestry Score (%) 44 %        PROMIS-10 Scores  Global Mental Health Score: (P) 13  Global Physical Health Score: (P) 13  PROMIS TOTAL - SUBSCORES: (P) 26    ROS:  A 12-point review of systems was completed and is negative except for otherwise noted above in the history of present illness.    Med Hx:  Past Medical History:   Diagnosis Date    Anemia     Arthritis     Fibroids     History of blood transfusion     Radon exposure     Scoliosis     Scoliosis of thoracic spine, unspecified scoliosis        Surg Hx:  Past Surgical History:   Procedure Laterality Date    COLONOSCOPY 2020    GYN SURGERY  2007    hysterectomy    HYSTERECTOMY  01/01/2007    fibroids    TONSILLECTOMY         Allergies:  Allergies   Allergen Reactions    Amoxicillin      Other Reaction(s): Gastrointestinal    Nauseated about 20 yrs        Meds:  Current Outpatient Medications   Medication Sig Dispense Refill    apixaban ANTICOAGULANT (ELIQUIS) 5 MG tablet Take 1 tablet (5 mg) by mouth 2 times daily 180 tablet 11    Calcium-Vitamin D-Vitamin K (CALCIUM + D + K PO) Take 1 tablet by mouth daily      MODERNA COVID-19 VACCINE 100 MCG/0.5ML injection       Multiple Vitamin (MULTI-VITAMIN DAILY PO)       TURMERIC CURCUMIN PO Take 1 tablet by mouth every other day       No current facility-administered medications for this visit.       Fam Hx:  Family History   Problem Relation Age of Onset    Cirrhosis Father     Heart Disease Father     Coronary Artery Disease Father     Hyperlipidemia Father     Depression Mother     Bipolar Disorder Mother     Rheumatoid Arthritis Mother     Mental Illness Mother     Osteoporosis Mother     Thyroid Disease Mother     Hyperlipidemia Brother     Thyroid Disease Sister     Depression Sister     Osteoporosis Paternal Grandmother     Coronary Artery Disease Paternal Grandmother     Coronary Artery Disease Paternal Grandfather     Osteoarthritis Other     Rashes/Skin Problems Maternal Aunt     Rheumatoid Arthritis Maternal Aunt     Cirrhosis Maternal Aunt     Rheumatoid Arthritis Maternal Aunt     Rheumatoid Arthritis Maternal Aunt     Depression Maternal Aunt     Rheumatoid Arthritis Maternal Aunt     Depression Maternal Aunt     Rheumatoid Arthritis Maternal Aunt     Hyperlipidemia Brother     Depression Sister     Thyroid Disease Sister        P/S Hx:  Social History     Tobacco Use    Smoking status: Former     Current packs/day: 0.00     Types: Cigarettes     Quit date: 6/24/2021     Years since quitting: 3.0    Smokeless tobacco: Never    Tobacco comments:     occ smoker   Substance Use Topics    Alcohol use: Yes     Comment: about 2 drinks per month.         Physical Exam:  Very pleasant, healthy appearing, alert, oriented x 3, cooperative.  Normal mood and affect.  Not in cardiorespiratory distress.  There were  no vitals taken for this visit.  Normal upright posture.    Normal gait without assistive device.  No antalgia / imbalance.    Neuro Exam:  Motor: NVI BLLE   Sensory: Intact BLLE    Lower Extremity:  Equal leg lengths, full pulses, (-) atrophy / asymmetry.  Full painless passive knee and ankle motion.  Straight leg raise: (-) right, (-) left.  Hip impingement: (-) right, (-) left.  ENRRIQUE/Silver's: (-) right, (-) left.      Imaging:    EOS and AP/lateral spine imaging reviewed 11/29/24    Thoracic scoliosis 58 degrees right approximate T9-L4  L4-S1 hemicurve 18 degrees     PI  63    L5-S1 12  L4-S1 35  L1-L4 26  PT 30    SVA +44 mm    Assessment:    1.  Adult Scoliosis no significant progression of deformity when compared to years prior   2.  History of osteopenia     Plan:    Patient has been followed for some time for her adult scoliosis. She has done well conservatively and would like to continue with conservative measures as long as possible. Her scoliosis also appears stable over the past 7 years without any significant progression.     - Referral for DEXA  - Referral to bone health clinic  - Continue with conservative measures at this time    Follow up in 2 years with rpt EOS  full body ap-lat standing x-rays.    Estrada Villa, DO  Spine Fellow     Attestation:  I (Dr. Prateek Clemens - Spine Surgeon) have personally evaluated patient with Spine Fellow Dr. Vidla Villa, and agree with findings and plan outlined in the note, which I also edited.  I discussed at length with the patient/family, explained the nature of spinal condition, and formulated workup and/or treatment plan together.  All questions were answered to the best of my ability and to patient's apparent satisfaction.     25 minutes spent on the date of the encounter doing chart review/review of outside records/review of test results/interpretation of tests/patient visit/documentation/discussion with other provider(s)/discussion with patient  and family.    Prateek Clemens MD    Orthopaedic Spine Surgery  Dept Orthopaedic Surgery, [facility]  [phone] office, [phone] pager  [email or web address]

## 2024-07-25 NOTE — LETTER
7/25/2024      Latricia Epperson  [address] [facility] [address]      Dear Colleague,    Thank you for referring your patient, Latricia Epperson, to the [facility] [place]. Please see a copy of my visit note below.    In-Person Visit    REASON FOR CONSULTATION: AIS   REFERRING PHYSICIAN: Ja Caraballo  PCP:Flor Atkins    History of Present Illness:  64 year old female, who has been followed with Dr. Caraballo for some time, referred to establish care as a new patient today. She has been doing well with conservative measures including occasional NSAIDs and continued PT/Schroth exercises. Denies any significant pain, weakness or radicular symptoms at this time.     Last Visit Date: 11/29/23 (c/o Dr. Ja Caraballo)  Previous Impression:  Adult thoracolumbar scoliosis nonprogressive and apparently responsive to Schroth therapy.   Previous Plan:  At this point surgery is not appropriate or necessary. Routine follow-up in 2 years would probably be appropriate left Latricia had a series of questions that she wanted to go through and we reviewed these today. We talked about follow-on care and I provided information for her. Will also give her a referral to an [facility] here in [facility] who does Schroth technique for adults.     Oswestry (SUZIE) Questionnaire        7/25/2024    11:02 AM   OSWESTRY DISABILITY INDEX   Count 5   Sum 11   Oswestry Score (%) 44 %        PROMIS-10 Scores  Global Mental Health Score: (P) 13  Global Physical Health Score: (P) 13  PROMIS TOTAL - SUBSCORES: (P) 26    ROS:  A 12-point review of systems was completed and is negative except for otherwise noted above in the history of present illness.    Med Hx:  Past Medical History:   Diagnosis Date     Anemia      Arthritis      Fibroids      History of blood transfusion      Radon exposure      Scoliosis      Scoliosis of thoracic spine, unspecified scoliosis        Surg Hx:  Past Surgical History:   Procedure Laterality Date      COLONOSCOPY  2020     GYN SURGERY  2007    hysterectomy     HYSTERECTOMY  01/01/2007    fibroids     TONSILLECTOMY         Allergies:  Allergies   Allergen Reactions     Amoxicillin      Other Reaction(s): Gastrointestinal    Nauseated about 20 yrs       Meds:  Current Outpatient Medications   Medication Sig Dispense Refill     apixaban ANTICOAGULANT (ELIQUIS) 5 MG tablet Take 1 tablet (5 mg) by mouth 2 times daily 180 tablet 11     Calcium-Vitamin D-Vitamin K (CALCIUM + D + K PO) Take 1 tablet by mouth daily       MODERNA COVID-19 VACCINE 100 MCG/0.5ML injection        Multiple Vitamin (MULTI-VITAMIN DAILY PO)        TURMERIC CURCUMIN PO Take 1 tablet by mouth every other day       No current facility-administered medications for this visit.       Fam Hx:  Family History   Problem Relation Age of Onset     Cirrhosis Father      Heart Disease Father      Coronary Artery Disease Father      Hyperlipidemia Father      Depression Mother      Bipolar Disorder Mother      Rheumatoid Arthritis Mother      Mental Illness Mother      Osteoporosis Mother      Thyroid Disease Mother      Hyperlipidemia Brother      Thyroid Disease Sister      Depression Sister      Osteoporosis Paternal Grandmother      Coronary Artery Disease Paternal Grandmother      Coronary Artery Disease Paternal Grandfather      Osteoarthritis Other      Rashes/Skin Problems Maternal Aunt      Rheumatoid Arthritis Maternal Aunt      Cirrhosis Maternal Aunt      Rheumatoid Arthritis Maternal Aunt      Rheumatoid Arthritis Maternal Aunt      Depression Maternal Aunt      Rheumatoid Arthritis Maternal Aunt      Depression Maternal Aunt      Rheumatoid Arthritis Maternal Aunt      Hyperlipidemia Brother      Depression Sister      Thyroid Disease Sister        P/S Hx:  Social History     Tobacco Use     Smoking status: Former     Current packs/day: 0.00     Types: Cigarettes     Quit date: 6/24/2021     Years since quitting: 3.0     Smokeless tobacco: Never      Tobacco comments:     occ smoker   Substance Use Topics     Alcohol use: Yes     Comment: about 2 drinks per month.         Physical Exam:  Very pleasant, healthy appearing, alert, oriented x 3, cooperative.  Normal mood and affect.  Not in cardiorespiratory distress.  There were no vitals taken for this visit.  Normal upright posture.    Normal gait without assistive device.  No antalgia / imbalance.    Neuro Exam:  Motor: NVI BLLE   Sensory: Intact BLLE    Lower Extremity:  Equal leg lengths, full pulses, (-) atrophy / asymmetry.  Full painless passive knee and ankle motion.  Straight leg raise: (-) right, (-) left.  Hip impingement: (-) right, (-) left.  ENRRIQUE/Silver's: (-) right, (-) left.      Imaging:    EOS and AP/lateral spine imaging reviewed 11/29/24    Thoracic scoliosis 58 degrees right approximate T9-L4  L4-S1 hemicurve 18 degrees     PI  63    L5-S1 12  L4-S1 35  L1-L4 26  PT 30    SVA +44 mm    Assessment:    1.  Adult Scoliosis no significant progression of deformity when compared to years prior   2.  History of osteopenia     Plan:    Patient has been followed for some time for her adult scoliosis. She has done well conservatively and would like to continue with conservative measures as long as possible. Her scoliosis also appears stable over the past 7 years without any significant progression.     - Referral for DEXA  - Referral to bone health clinic  - Continue with conservative measures at this time    Follow up in 2 years with rpt EOS  full body ap-lat standing x-rays.    Estrada Villa, DO  Spine Fellow     Attestation:  I (Dr. Prateek Clemens - Spine Surgeon) have personally evaluated patient with Spine Fellow Dr. Vidal Villa, and agree with findings and plan outlined in the note, which I also edited.  I discussed at length with the patient/family, explained the nature of spinal condition, and formulated workup and/or treatment plan together.  All questions were answered to the  best of my ability and to patient's apparent satisfaction.     25 minutes spent on the date of the encounter doing chart review/review of outside records/review of test results/interpretation of tests/patient visit/documentation/discussion with other provider(s)/discussion with patient and family.    Prateek Clemens MD    Orthopaedic Spine Surgery  Dept Orthopaedic Surgery, [facility]  [phone] office, [phone] pager  [email or web address]       Again, thank you for allowing me to participate in the care of your patient.        Sincerely,        Prateek Clemens MD

## 2024-08-09 ENCOUNTER — LAB (OUTPATIENT)
Dept: LAB | Facility: CLINIC | Age: 64
End: 2024-08-09
Payer: COMMERCIAL

## 2024-08-09 DIAGNOSIS — Z86.711 HISTORY OF PULMONARY EMBOLISM: ICD-10-CM

## 2024-08-09 LAB — D DIMER PPP FEU-MCNC: 0.41 UG/ML FEU (ref 0–0.5)

## 2024-08-09 PROCEDURE — 36415 COLL VENOUS BLD VENIPUNCTURE: CPT

## 2024-08-09 PROCEDURE — 85379 FIBRIN DEGRADATION QUANT: CPT

## 2024-09-21 ENCOUNTER — HEALTH MAINTENANCE LETTER (OUTPATIENT)
Age: 64
End: 2024-09-21

## 2024-09-24 ENCOUNTER — MYC MEDICAL ADVICE (OUTPATIENT)
Dept: ORTHOPEDICS | Facility: CLINIC | Age: 64
End: 2024-09-24
Payer: COMMERCIAL

## 2024-09-24 NOTE — TELEPHONE ENCOUNTER
See My chart message from pt that DEXA is scheduled.  See dictation for plan.    I called pt & explained again that pt needs a NEW bone health consult with our Sports clinic [phone] for after DEXA & pt agreed & I transferred pt to scheduling.  I also advised pt to call Insurance to check on coverage & pt agreed.  Call back prn. Manuela Page RN.

## 2024-09-30 NOTE — TELEPHONE ENCOUNTER
DIAGNOSIS: Thoracogenic scoliosis of thoracolumbar region, Adolescent idiopathic scoliosis of thoracolumbar region, Osteopenia of multiple sites   APPOINTMENT DATE: 10/25/2024   NOTES STATUS DETAILS   OFFICE NOTE from referring provider [facility]:  7/25/24 - ORTHO OV with Dr. Clemens   OFFICE NOTE from other specialist [facility]:  11/29/23 - ORTHO OV with Dr. Caraballo  3/31/22 - PCC OV with Mya Lowe, NP  7/25/17 - ORTHO OV with Dr. Kinney   DISCHARGE SUMMARY from hospital N/A    DISCHARGE REPORT from the ER N/A    OPERATIVE REPORT N/A    MEDICATION LIST Internal    DEXA (osteoporosis/bone health) Received / [facility]:  ([facility]) 10/14/24 - DEXA  1/10/22 - DEXA    [facility]:  1/12/15 - DEXA   XRAYS (IMAGES & REPORTS) [facility]:  11/30/17 - XR Spine  7/25/17 - XR Spine       Records Requested    Facility  [facility]  Fax: [phone]   Outcome * 9/30/24 10:06 AM Faxed urgent request to [facility] for images to be sent to the clinic. - Dee    * 9/30/24 11:30 AM Images received from [facility] and attached to the patient in PACs. - Dee

## 2024-10-14 ENCOUNTER — ANCILLARY PROCEDURE (OUTPATIENT)
Dept: BONE DENSITY | Facility: CLINIC | Age: 64
End: 2024-10-14
Attending: ORTHOPAEDIC SURGERY
Payer: COMMERCIAL

## 2024-10-14 DIAGNOSIS — M41.125 ADOLESCENT IDIOPATHIC SCOLIOSIS OF THORACOLUMBAR REGION: ICD-10-CM

## 2024-10-14 DIAGNOSIS — M41.35 THORACOGENIC SCOLIOSIS OF THORACOLUMBAR REGION: ICD-10-CM

## 2024-10-14 DIAGNOSIS — M85.89 OSTEOPENIA OF MULTIPLE SITES: ICD-10-CM

## 2024-10-14 PROCEDURE — 77091 TBS TECHL CALCULATION ONLY: CPT | Performed by: PHYSICIAN ASSISTANT

## 2024-10-14 PROCEDURE — 77081 DXA BONE DENSITY APPENDICULR: CPT | Mod: TC | Performed by: PHYSICIAN ASSISTANT

## 2024-10-14 PROCEDURE — 77080 DXA BONE DENSITY AXIAL: CPT | Mod: TC | Performed by: PHYSICIAN ASSISTANT

## 2024-10-15 ENCOUNTER — ALLIED HEALTH/NURSE VISIT (OUTPATIENT)
Dept: FAMILY MEDICINE | Facility: CLINIC | Age: 64
End: 2024-10-15
Payer: COMMERCIAL

## 2024-10-15 DIAGNOSIS — Z23 ENCOUNTER FOR IMMUNIZATION: Primary | ICD-10-CM

## 2024-10-15 PROCEDURE — 90750 HZV VACC RECOMBINANT IM: CPT

## 2024-10-15 PROCEDURE — 90471 IMMUNIZATION ADMIN: CPT

## 2024-10-15 NOTE — PROGRESS NOTES
Prior to immunization administration, verified patients identity using patient s name and date of birth. Please see Immunization Activity for additional information.     Is the patient's temperature normal (100.5 or less)? Yes     Patient MEETS CRITERIA. PROCEED with vaccine administration.      Patient instructed to remain in clinic for 15 minutes afterwards, and to report any adverse reactions.      Link to Ancillary Visit Immunization Standing Orders SmartSet     Screening performed by Mohit Hatfield MA on 10/15/2024 at 8:19 AM.

## 2024-10-25 ENCOUNTER — LAB (OUTPATIENT)
Dept: LAB | Facility: CLINIC | Age: 64
End: 2024-10-25
Payer: COMMERCIAL

## 2024-10-25 ENCOUNTER — PRE VISIT (OUTPATIENT)
Dept: ORTHOPEDICS | Facility: CLINIC | Age: 64
End: 2024-10-25

## 2024-10-25 ENCOUNTER — OFFICE VISIT (OUTPATIENT)
Dept: ORTHOPEDICS | Facility: CLINIC | Age: 64
End: 2024-10-25
Attending: ORTHOPAEDIC SURGERY
Payer: COMMERCIAL

## 2024-10-25 DIAGNOSIS — M85.89 OSTEOPENIA OF MULTIPLE SITES: ICD-10-CM

## 2024-10-25 DIAGNOSIS — M41.35 THORACOGENIC SCOLIOSIS OF THORACOLUMBAR REGION: ICD-10-CM

## 2024-10-25 DIAGNOSIS — M41.125 ADOLESCENT IDIOPATHIC SCOLIOSIS OF THORACOLUMBAR REGION: ICD-10-CM

## 2024-10-25 DIAGNOSIS — E21.3 HYPERPARATHYROIDISM (H): ICD-10-CM

## 2024-10-25 DIAGNOSIS — M85.89 OSTEOPENIA OF MULTIPLE SITES: Primary | ICD-10-CM

## 2024-10-25 LAB
ALBUMIN SERPL BCG-MCNC: 4.2 G/DL (ref 3.5–5.2)
ALP SERPL-CCNC: 101 U/L (ref 40–150)
ALT SERPL W P-5'-P-CCNC: 14 U/L (ref 0–50)
ANION GAP SERPL CALCULATED.3IONS-SCNC: 10 MMOL/L (ref 7–15)
AST SERPL W P-5'-P-CCNC: 22 U/L (ref 0–45)
BILIRUB SERPL-MCNC: 0.3 MG/DL
BUN SERPL-MCNC: 16.9 MG/DL (ref 8–23)
CALCIUM SERPL-MCNC: 9.4 MG/DL (ref 8.8–10.4)
CHLORIDE SERPL-SCNC: 105 MMOL/L (ref 98–107)
CREAT SERPL-MCNC: 0.93 MG/DL (ref 0.51–0.95)
EGFRCR SERPLBLD CKD-EPI 2021: 69 ML/MIN/1.73M2
ERYTHROCYTE [DISTWIDTH] IN BLOOD BY AUTOMATED COUNT: 12.3 % (ref 10–15)
GLUCOSE SERPL-MCNC: 94 MG/DL (ref 70–99)
HCO3 SERPL-SCNC: 26 MMOL/L (ref 22–29)
HCT VFR BLD AUTO: 42.4 % (ref 35–47)
HGB BLD-MCNC: 13.8 G/DL (ref 11.7–15.7)
MAGNESIUM SERPL-MCNC: 2.2 MG/DL (ref 1.7–2.3)
MCH RBC QN AUTO: 29.1 PG (ref 26.5–33)
MCHC RBC AUTO-ENTMCNC: 32.5 G/DL (ref 31.5–36.5)
MCV RBC AUTO: 90 FL (ref 78–100)
PHOSPHATE SERPL-MCNC: 3.9 MG/DL (ref 2.5–4.5)
PLATELET # BLD AUTO: 285 10E3/UL (ref 150–450)
POTASSIUM SERPL-SCNC: 4.9 MMOL/L (ref 3.4–5.3)
PROT SERPL-MCNC: 7.1 G/DL (ref 6.4–8.3)
PTH-INTACT SERPL-MCNC: 72 PG/ML (ref 15–65)
RBC # BLD AUTO: 4.74 10E6/UL (ref 3.8–5.2)
SODIUM SERPL-SCNC: 141 MMOL/L (ref 135–145)
TSH SERPL DL<=0.005 MIU/L-ACNC: 1.4 UIU/ML (ref 0.3–4.2)
VIT D+METAB SERPL-MCNC: 44 NG/ML (ref 20–50)
WBC # BLD AUTO: 5.8 10E3/UL (ref 4–11)

## 2024-10-25 PROCEDURE — 80053 COMPREHEN METABOLIC PANEL: CPT | Performed by: PATHOLOGY

## 2024-10-25 PROCEDURE — 83970 ASSAY OF PARATHORMONE: CPT | Performed by: PATHOLOGY

## 2024-10-25 PROCEDURE — 84100 ASSAY OF PHOSPHORUS: CPT | Performed by: PATHOLOGY

## 2024-10-25 PROCEDURE — 84443 ASSAY THYROID STIM HORMONE: CPT | Performed by: PATHOLOGY

## 2024-10-25 PROCEDURE — 82306 VITAMIN D 25 HYDROXY: CPT | Performed by: FAMILY MEDICINE

## 2024-10-25 PROCEDURE — 83735 ASSAY OF MAGNESIUM: CPT | Performed by: PATHOLOGY

## 2024-10-25 PROCEDURE — 85027 COMPLETE CBC AUTOMATED: CPT | Performed by: PATHOLOGY

## 2024-10-25 PROCEDURE — 99204 OFFICE O/P NEW MOD 45 MIN: CPT | Performed by: FAMILY MEDICINE

## 2024-10-25 PROCEDURE — 99000 SPECIMEN HANDLING OFFICE-LAB: CPT | Performed by: PATHOLOGY

## 2024-10-25 PROCEDURE — 36415 COLL VENOUS BLD VENIPUNCTURE: CPT | Performed by: PATHOLOGY

## 2024-10-25 NOTE — PROGRESS NOTES
SUBJECTIVE:    Latricia Epperson is a 64 year old female here today to discuss osteoporosis.  Dr. Caraballo for a long time, trying not to have surgery, doing exercises, seen by Dr. Clemens. DXA decreasing BMD. Previous DEXA done 10/14/24.  Severe scoliosis. T-score showed her to be Osteoporosis.  Risk factors for osteoporosis include postmenopausal,  or , former smoker, no Cancer- no chemo or radiation- precancerous colon polyp and precancerous skin cancers, and no kidney stones, no MI, no stroke. 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- no medical or dental  Cleanings 2 times a year  Mom- healthy, RA, no bone issues, extreme mental health  GMa- had OP  Used to take calcium- heard out things happening with calcium pills  Trying to get more food with calcium  Multivitamin- has vit D, taken in the winter  Exercise- gardening with veggies, walking 3 miles, rakes leaves  Scoliosis- activity daily  30 min at least 5 days a week  Strength training- scoliosis exercises, not enough strength  Push ups 10, plank, balance- PT in [place]  Yoga in the past- twist and bending not good, [place]  Balance every other day, poles to walk  Active- now retired, not as busy  5 years- retired  Snow shoeing, burst of activity- not doing as much going places, in front of TV or sitting more  2022- DXA    DXA  INTERVAL CHANGE  -There has been a 3.8% decrease in lumbar spine BMD.  -There has been a 1.9% decrease in bilateral hip BMD.  -There has been a 5.4% decrease in the right radius 33% BMD.  Concerned that she couldn't do surgery, questions if surgery might be needed in the future    Osteoporosis Risk Score/FRAX score    [email or web address]/[email or web address]  Risk of Hip Fracture:  (Significant if >3%)  Risk of Overall Fracture:  (Significant if >20%)    Past Medical History:   Diagnosis Date    Anemia     Arthritis      Fibroids     History of blood transfusion     Radon exposure     Scoliosis     Scoliosis of thoracic spine, unspecified scoliosis        Past Surgical History:   Procedure Laterality Date    COLONOSCOPY 2020    GYN SURGERY  2007    hysterectomy    HYSTERECTOMY  01/01/2007    fibroids    TONSILLECTOMY         Current Outpatient Medications   Medication Sig Dispense Refill    apixaban ANTICOAGULANT (ELIQUIS) 5 MG tablet Take 1 tablet (5 mg) by mouth 2 times daily (Patient not taking: Reported on 10/25/2024) 180 tablet 11    Calcium-Vitamin D-Vitamin K (CALCIUM + D + K PO) Take 1 tablet by mouth daily (Patient not taking: Reported on 10/25/2024)      MODERNA COVID-19 VACCINE 100 MCG/0.5ML injection       Multiple Vitamin (MULTI-VITAMIN DAILY PO)       TURMERIC CURCUMIN PO Take 1 tablet by mouth every other day         Family History   Problem Relation Age of Onset    Cirrhosis Father     Heart Disease Father     Coronary Artery Disease Father     Hyperlipidemia Father     Depression Mother     Bipolar Disorder Mother     Rheumatoid Arthritis Mother     Mental Illness Mother     Osteoporosis Mother     Thyroid Disease Mother     Hyperlipidemia Brother     Thyroid Disease Sister     Depression Sister     Osteoporosis Paternal Grandmother     Coronary Artery Disease Paternal Grandmother     Coronary Artery Disease Paternal Grandfather     Osteoarthritis Other     Rashes/Skin Problems Maternal Aunt     Rheumatoid Arthritis Maternal Aunt     Cirrhosis Maternal Aunt     Rheumatoid Arthritis Maternal Aunt     Rheumatoid Arthritis Maternal Aunt     Depression Maternal Aunt     Rheumatoid Arthritis Maternal Aunt     Depression Maternal Aunt     Rheumatoid Arthritis Maternal Aunt     Hyperlipidemia Brother     Depression Sister     Thyroid Disease Sister        Social History     Socioeconomic History    Marital status:      Spouse name: Not on file    Number of children: Not on file    Years of education: Not on file     Highest education level: Not on file   Occupational History    Not on file   Tobacco Use    Smoking status: Former     Current packs/day: 0.00     Types: Cigarettes     Quit date: 6/24/2021     Years since quitting: 3.3    Smokeless tobacco: Never    Tobacco comments:     occ smoker   Substance and Sexual Activity    Alcohol use: Yes     Comment: about 2 drinks per month.    Drug use: No    Sexual activity: Not Currently     Partners: Male     Birth control/protection: Female Surgical   Other Topics Concern     Service No    Blood Transfusions Yes    Caffeine Concern Yes     Comment: 2 or more coffee/day    Occupational Exposure No    Hobby Hazards No    Sleep Concern Yes     Comment: averages 5-6 hr/ noc and needs 7-8    Stress Concern No    Weight Concern No    Special Diet No    Back Care No    Exercise Yes     Comment: yoga and walking.    Bike Helmet Not Asked    Seat Belt Yes    Self-Exams Not Asked    Parent/sibling w/ CABG, MI or angioplasty before 65F 55M? No   Social History Narrative    .   for a company.       Social Drivers of Health     Financial Resource Strain: Low Risk  (10/26/2023)    Financial Resource Strain     Within the past 12 months, have you or your family members you live with been unable to get utilities (heat, electricity) when it was really needed?: No   Food Insecurity: Low Risk  (10/26/2023)    Food Insecurity     Within the past 12 months, did you worry that your food would run out before you got money to buy more?: No     Within the past 12 months, did the food you bought just not last and you didn t have money to get more?: No   Transportation Needs: Low Risk  (10/26/2023)    Transportation Needs     Within the past 12 months, has lack of transportation kept you from medical appointments, getting your medicines, non-medical meetings or appointments, work, or from getting things that you need?: No   Physical Activity: Not on file  "  Stress: Not on file   Social Connections: Not on file   Interpersonal Safety: Low Risk  (10/26/2023)    Interpersonal Safety     Do you feel physically and emotionally safe where you currently live?: Yes     Within the past 12 months, have you been hit, slapped, kicked or otherwise physically hurt by someone?: No     Within the past 12 months, have you been humiliated or emotionally abused in other ways by your partner or ex-partner?: No   Housing Stability: Low Risk  (10/26/2023)    Housing Stability     Do you have housing? : Yes     Are you worried about losing your housing?: No       OBJECTIVE:  There were no vitals taken for this visit.  There has been a change in patients height. 5'4\" and now some days 5'2\"    XR EOS Total Body  1. Marked convex left curvature of the thoracolumbar spine.   2. No  global coronal or sagittal imbalance.  3. Weight bearing axis as detailed above.    ASSESSMENT:  Osteopenia in menopause  Former smoker  No Fractures    PLAN:  Labs- mild PTH elevation, normal Vit D  Fosamax- pt information  Calcium rich foods    The importance of calcium and vitamin D in bone health was discussed in detail. A calcium intake of 1500 mg total per day in divided doses, to include diet and supplements, was recommended.  I have urged the patient to obtain as much as the recommended amount of calcium as possible from the diet.  I recommended use of the International Osteoporosis Foundation Calcium Calculator to get an estimate of daily total intake in the diet (www.iofcalciumcalculator).800-1000 international units vitamin D daily was also recommended.       We also discussed the role of weight bearing exercise for the treatment of bone loss. I have also recommended weight training at a minimum of 2x/week.        Bri Silvestre MD, [facility]  Sports Medicine and Bone Health      "

## 2024-10-25 NOTE — PATIENT INSTRUCTIONS
Calcium 1500mg ( 500mg three times a day)    Vit D3  40-50 Goal      Calcium rich foods  Pt information Fosamax

## 2024-10-25 NOTE — LETTER
10/25/2024      RE: Latricia Epperson  [address]  [place] [address]     Dear Colleague,    Thank you for referring your patient, Latricia Epperson, to the [facility] SPORTS [facility]IC[facility] [place]. Please see a copy of my visit note below.    SUBJECTIVE:    Latricia Epperson is a 64 year old female here today to discuss osteoporosis.  Dr. Caraballo for a long time, trying not to have surgery, doing exercises, seen by Dr. Clemens. DXA decreasing BMD. Previous DEXA done 10/14/24.  Severe scoliosis. T-score showed her to be Osteoporosis.  Risk factors for osteoporosis include postmenopausal,  or , former smoker, no Cancer- no chemo or radiation- precancerous colon polyp and precancerous skin cancers, and no kidney stones, no MI, no stroke. 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- no medical or dental  Cleanings 2 times a year  Mom- healthy, RA, no bone issues, extreme mental health  GMa- had OP  Used to take calcium- heard out things happening with calcium pills  Trying to get more food with calcium  Multivitamin- has vit D, taken in the winter  Exercise- gardening with veggies, walking 3 miles, rakes leaves  Scoliosis- activity daily  30 min at least 5 days a week  Strength training- scoliosis exercises, not enough strength  Push ups 10, plank, balance- PT in [place]  Yoga in the past- twist and bending not good, [facility] Padilla  Balance every other day, poles to walk  Active- now retired, not as busy  5 years- retired  Snow shoeing, burst of activity- not doing as much going places, in front of TV or sitting more  2022- DXA    DXA  INTERVAL CHANGE  -There has been a 3.8% decrease in lumbar spine BMD.  -There has been a 1.9% decrease in bilateral hip BMD.  -There has been a 5.4% decrease in the right radius 33% BMD.  Concerned that she couldn't do surgery, questions if surgery  might be needed in the future    Osteoporosis Risk Score/FRAX score    [email or web address].uk/[email or web address]/  Risk of Hip Fracture:  (Significant if >3%)  Risk of Overall Fracture:  (Significant if >20%)    Past Medical History:   Diagnosis Date     Anemia      Arthritis      Fibroids      History of blood transfusion      Radon exposure      Scoliosis      Scoliosis of thoracic spine, unspecified scoliosis        Past Surgical History:   Procedure Laterality Date     COLONOSCOPY 2020     GYN SURGERY  2007    hysterectomy     HYSTERECTOMY  01/01/2007    fibroids     TONSILLECTOMY         Current Outpatient Medications   Medication Sig Dispense Refill     apixaban ANTICOAGULANT (ELIQUIS) 5 MG tablet Take 1 tablet (5 mg) by mouth 2 times daily (Patient not taking: Reported on 10/25/2024) 180 tablet 11     Calcium-Vitamin D-Vitamin K (CALCIUM + D + K PO) Take 1 tablet by mouth daily (Patient not taking: Reported on 10/25/2024)       MODERNA COVID-19 VACCINE 100 MCG/0.5ML injection        Multiple Vitamin (MULTI-VITAMIN DAILY PO)        TURMERIC CURCUMIN PO Take 1 tablet by mouth every other day         Family History   Problem Relation Age of Onset     Cirrhosis Father      Heart Disease Father      Coronary Artery Disease Father      Hyperlipidemia Father      Depression Mother      Bipolar Disorder Mother      Rheumatoid Arthritis Mother      Mental Illness Mother      Osteoporosis Mother      Thyroid Disease Mother      Hyperlipidemia Brother      Thyroid Disease Sister      Depression Sister      Osteoporosis Paternal Grandmother      Coronary Artery Disease Paternal Grandmother      Coronary Artery Disease Paternal Grandfather      Osteoarthritis Other      Rashes/Skin Problems Maternal Aunt      Rheumatoid Arthritis Maternal Aunt      Cirrhosis Maternal Aunt      Rheumatoid Arthritis Maternal Aunt      Rheumatoid Arthritis Maternal Aunt      Depression Maternal Aunt      Rheumatoid Arthritis Maternal Aunt      Depression  Maternal Aunt      Rheumatoid Arthritis Maternal Aunt      Hyperlipidemia Brother      Depression Sister      Thyroid Disease Sister        Social History     Socioeconomic History     Marital status:      Spouse name: Not on file     Number of children: Not on file     Years of education: Not on file     Highest education level: Not on file   Occupational History     Not on file   Tobacco Use     Smoking status: Former     Current packs/day: 0.00     Types: Cigarettes     Quit date: 6/24/2021     Years since quitting: 3.3     Smokeless tobacco: Never     Tobacco comments:     occ smoker   Substance and Sexual Activity     Alcohol use: Yes     Comment: about 2 drinks per month.     Drug use: No     Sexual activity: Not Currently     Partners: Male     Birth control/protection: Female Surgical   Other Topics Concern      Service No     Blood Transfusions Yes     Caffeine Concern Yes     Comment: 2 or more coffee/day     Occupational Exposure No     Hobby Hazards No     Sleep Concern Yes     Comment: averages 5-6 hr/ noc and needs 7-8     Stress Concern No     Weight Concern No     Special Diet No     Back Care No     Exercise Yes     Comment: yoga and walking.     Bike Helmet Not Asked     Seat Belt Yes     Self-Exams Not Asked     Parent/sibling w/ CABG, MI or angioplasty before 65F 55M? No   Social History Narrative    .   for a company.       Social Drivers of Health     Financial Resource Strain: Low Risk  (10/26/2023)    Financial Resource Strain      Within the past 12 months, have you or your family members you live with been unable to get utilities (heat, electricity) when it was really needed?: No   Food Insecurity: Low Risk  (10/26/2023)    Food Insecurity      Within the past 12 months, did you worry that your food would run out before you got money to buy more?: No      Within the past 12 months, did the food you bought just not last and you didn t have  "money to get more?: No   Transportation Needs: Low Risk  (10/26/2023)    Transportation Needs      Within the past 12 months, has lack of transportation kept you from medical appointments, getting your medicines, non-medical meetings or appointments, work, or from getting things that you need?: No   Physical Activity: Not on file   Stress: Not on file   Social Connections: Not on file   Interpersonal Safety: Low Risk  (10/26/2023)    Interpersonal Safety      Do you feel physically and emotionally safe where you currently live?: Yes      Within the past 12 months, have you been hit, slapped, kicked or otherwise physically hurt by someone?: No      Within the past 12 months, have you been humiliated or emotionally abused in other ways by your partner or ex-partner?: No   Housing Stability: Low Risk  (10/26/2023)    Housing Stability      Do you have housing? : Yes      Are you worried about losing your housing?: No       OBJECTIVE:  There were no vitals taken for this visit.  There has been a change in patients height. 5'4\" and now some days 5'2\"    XR EOS Total Body  1. Marked convex left curvature of the thoracolumbar spine.   2. No  global coronal or sagittal imbalance.  3. Weight bearing axis as detailed above.    ASSESSMENT:  Osteopenia in menopause  Former smoker  No Fractures    PLAN:  Labs- mild PTH elevation, normal Vit D  Fosamax- pt information  Calcium rich foods    The importance of calcium and vitamin D in bone health was discussed in detail. A calcium intake of 1500 mg total per day in divided doses, to include diet and supplements, was recommended.  I have urged the patient to obtain as much as the recommended amount of calcium as possible from the diet.  I recommended use of the International Osteoporosis Foundation Calcium Calculator to get an estimate of daily total intake in the diet (www.iofcalciumcalculator).800-1000 international units vitamin D daily was also recommended.       We also " discussed the role of weight bearing exercise for the treatment of bone loss. I have also recommended weight training at a minimum of 2x/week.        Bri Silvestre MD, [facility]  Sports Medicine and Bone Health        Again, thank you for allowing me to participate in the care of your patient.      Sincerely,    Bri Silvestre MD

## 2024-10-27 ENCOUNTER — MYC MEDICAL ADVICE (OUTPATIENT)
Dept: ORTHOPEDICS | Facility: CLINIC | Age: 64
End: 2024-10-27
Payer: COMMERCIAL

## 2024-10-28 ENCOUNTER — MYC MEDICAL ADVICE (OUTPATIENT)
Dept: ORTHOPEDICS | Facility: CLINIC | Age: 64
End: 2024-10-28
Payer: COMMERCIAL

## 2024-10-28 DIAGNOSIS — E21.3 HYPERPARATHYROIDISM (H): ICD-10-CM

## 2024-10-28 DIAGNOSIS — M85.89 OSTEOPENIA OF MULTIPLE SITES: Primary | ICD-10-CM

## 2024-10-29 NOTE — TELEPHONE ENCOUNTER
See My Chart messages from pt & reply from Gay PURCELL.  I called pt back who stated she will keep RTN appt with  on 11-21-24 but before deciding about starting a heavy duty bone health treatment med with risks,  pt wants  opinion of DEXA result & whether pt needs a different test done?    I told pt that I will review with  & get back to pt.  Pt agreed.  Manuela Page RN.

## 2024-10-30 NOTE — TELEPHONE ENCOUNTER
See my previous triage reply.    I left pt VM today 10-30-24 that  reviewed DEXA result & wants pt to make RTN appt with him to review due to complicated nature of result & treatment.  Pt to call back [phone] & make RTN in person appt with  , open NENO slots available next week 11-7-24.  Call back prn.   V.O.R.B./Manuela Page RN.

## 2024-10-31 NOTE — TELEPHONE ENCOUNTER
Patient called back to confirm that 11/07/24 appt date will not work for her. Reschedule her to 11/14/24 at 10:15 am for a open NENO slot. Want to make sure this is ok instead? If not I can call to reschedule again. Thank you

## 2024-11-12 ENCOUNTER — TRANSFERRED RECORDS (OUTPATIENT)
Dept: HEALTH INFORMATION MANAGEMENT | Facility: CLINIC | Age: 64
End: 2024-11-12
Payer: COMMERCIAL

## 2024-11-13 DIAGNOSIS — M41.35 THORACOGENIC SCOLIOSIS OF THORACOLUMBAR REGION: Primary | ICD-10-CM

## 2024-11-14 ENCOUNTER — LAB (OUTPATIENT)
Dept: LAB | Facility: CLINIC | Age: 64
End: 2024-11-14
Payer: COMMERCIAL

## 2024-11-14 ENCOUNTER — ANCILLARY PROCEDURE (OUTPATIENT)
Dept: GENERAL RADIOLOGY | Facility: CLINIC | Age: 64
End: 2024-11-14
Attending: ORTHOPAEDIC SURGERY
Payer: COMMERCIAL

## 2024-11-14 ENCOUNTER — OFFICE VISIT (OUTPATIENT)
Dept: ORTHOPEDICS | Facility: CLINIC | Age: 64
End: 2024-11-14
Payer: COMMERCIAL

## 2024-11-14 ENCOUNTER — ANCILLARY PROCEDURE (OUTPATIENT)
Dept: ULTRASOUND IMAGING | Facility: CLINIC | Age: 64
End: 2024-11-14
Attending: FAMILY MEDICINE
Payer: COMMERCIAL

## 2024-11-14 VITALS — BODY MASS INDEX: 26.5 KG/M2 | WEIGHT: 144 LBS | HEIGHT: 62 IN

## 2024-11-14 DIAGNOSIS — E21.3 HYPERPARATHYROIDISM (H): ICD-10-CM

## 2024-11-14 DIAGNOSIS — M41.35 THORACOGENIC SCOLIOSIS OF THORACOLUMBAR REGION: ICD-10-CM

## 2024-11-14 DIAGNOSIS — M41.125 ADOLESCENT IDIOPATHIC SCOLIOSIS OF THORACOLUMBAR REGION: Primary | ICD-10-CM

## 2024-11-14 DIAGNOSIS — M85.89 OSTEOPENIA OF MULTIPLE SITES: ICD-10-CM

## 2024-11-14 PROCEDURE — 76536 US EXAM OF HEAD AND NECK: CPT | Mod: GC | Performed by: STUDENT IN AN ORGANIZED HEALTH CARE EDUCATION/TRAINING PROGRAM

## 2024-11-14 PROCEDURE — 72082 X-RAY EXAM ENTIRE SPI 2/3 VW: CPT | Performed by: STUDENT IN AN ORGANIZED HEALTH CARE EDUCATION/TRAINING PROGRAM

## 2024-11-14 PROCEDURE — 77073 BONE LENGTH STUDIES: CPT | Performed by: STUDENT IN AN ORGANIZED HEALTH CARE EDUCATION/TRAINING PROGRAM

## 2024-11-14 PROCEDURE — 99213 OFFICE O/P EST LOW 20 MIN: CPT | Performed by: ORTHOPAEDIC SURGERY

## 2024-11-14 NOTE — LETTER
11/14/2024      Latricia Epperson  [address] [address] [address]      Dear Colleague,    Thank you for referring your patient, Latricia Epperson, to the [facility] [place]. Please see a copy of my visit note below.          Latricia returns for routine follow-up of her scoliosis.  He has been doing massage therapy and exercises which have helped her significantly.  These are medically appropriate given her condition.  Her current visual analog pain scale is a 3.  Oswestry disability index 31%.  SRS 22 our questionnaire is 54.7%.  She has been seen by Dr. Silvestre for evaluation of her bone health.  She had a DEXA about 2 years ago and the recent one shows that she is in the low osteopenia range.  Apparently Dr. Silvestre has ordered an ultrasound to assess her parathyroids.    Objective physical exam shows a well-developed well-nourished female appearing her stated age.  Her head is centered shoulders and pelvis are level.  On Hi forward bending test her main thoracic region has an angle of trunk rotation of 5 degrees right side high her thoracolumbar region shows an angle of trunk rotation 15 degrees left side high.    Imaging: I have independently ordered and interpreted AP and lateral full spine radiographs.  Comparison is made to radiographs from a year ago.  There is been no significant interval change.    Assessment: Adolescent idiopathic scoliosis in adulthood.  Stable.  Osteopenia.  Progressive.    Plan: We had an extended discussion about bone health, interventions and scoliosis progression.  Her sagittal alignment is excellent which I think is why she has done so well.  Her bone health I explained the concept of peak bone mass then progressive loss, sudden significant loss with menopause, return to slow progressive loss.  In talking about medications it sounds like the bone health recommendation has been for her to be on Fosamax.  She asked specifically about jaw necrosis.   This is an extremely rare side effect profile and not a great reason to not take this medication.  And I think this is a discussion that she to have in more detail with Dr. Silvestre.    In terms of routine follow-up for her scoliosis she should be seen probably in 2 years.  Any one of my partners would be able to see her.    Ja Caraballo MD      Again, thank you for allowing me to participate in the care of your patient.        Sincerely,        Ja Caraballo MD

## 2024-11-14 NOTE — PROGRESS NOTES
Latricia returns for routine follow-up of her scoliosis.  He has been doing massage therapy and exercises which have helped her significantly.  These are medically appropriate given her condition.  Her current visual analog pain scale is a 3.  Oswestry disability index 31%.  SRS 22 our questionnaire is 54.7%.  She has been seen by Dr. Silvestre for evaluation of her bone health.  She had a DEXA about 2 years ago and the recent one shows that she is in the low osteopenia range.  Apparently Dr. Silvestre has ordered an ultrasound to assess her parathyroids.    Objective physical exam shows a well-developed well-nourished female appearing her stated age.  Her head is centered shoulders and pelvis are level.  On Hi forward bending test her main thoracic region has an angle of trunk rotation of 5 degrees right side high her thoracolumbar region shows an angle of trunk rotation 15 degrees left side high.    Imaging: I have independently ordered and interpreted AP and lateral full spine radiographs.  Comparison is made to radiographs from a year ago.  There is been no significant interval change.    Assessment: Adolescent idiopathic scoliosis in adulthood.  Stable.  Osteopenia.  Progressive.    Plan: We had an extended discussion about bone health, interventions and scoliosis progression.  Her sagittal alignment is excellent which I think is why she has done so well.  Her bone health I explained the concept of peak bone mass then progressive loss, sudden significant loss with menopause, return to slow progressive loss.  In talking about medications it sounds like the bone health recommendation has been for her to be on Fosamax.  She asked specifically about jaw necrosis.  This is an extremely rare side effect profile and not a great reason to not take this medication.  And I think this is a discussion that she to have in more detail with Dr. Silvestre.    In terms of routine follow-up for her scoliosis she should be  seen probably in 2 years.  Any one of my partners would be able to see her.    Ja Caraballo MD

## 2024-11-14 NOTE — NURSING NOTE
"Reason For Visit:   Chief Complaint   Patient presents with    RECHECK     F/U Dexa scan and  bone health       Primary MD: Flor Atkins  Ref. MD: EST    ?  No     Occupation Retired/ Disability     Date of injury: no  Type of injury: no.     Date of surgery: no  Type of surgery: no.     Smoker: No  Request smoking cessation information: No       Ht 1.575 m (5' 2\")   Wt 65.3 kg (144 lb)   BMI 26.34 kg/m      Pain Assessment  Patient Currently in Pain: Yes  0-10 Pain Scale: 3  Primary Pain Location: Back    SRS 54.7%    Oswestry (SUZIE) Questionnaire        11/14/2024    10:40 AM   OSWESTRY DISABILITY INDEX   Count 9    Sum 14    Oswestry Score (%) 31.11 %        Patient-reported      Visual Analog Pain Scale  Back Pain Scale 0-10: 3  Right leg pain: 0  Left leg pain: 0  Neck Pain Scale 0-10: 0  Right arm pain: 0  Left arm pain: 0    Promis 10 Assessment        11/14/2024    10:43 AM   PROMIS 10   In general, would you say your health is: Good   In general, would you say your quality of life is: Very good   In general, how would you rate your physical health? Fair   In general, how would you rate your mental health, including your mood and your ability to think? Very good   In general, how would you rate your satisfaction with your social activities and relationships? Very good   In general, please rate how well you carry out your usual social activities and roles Very good   To what extent are you able to carry out your everyday physical activities such as walking, climbing stairs, carrying groceries, or moving a chair? Moderately   In the past 7 days, how often have you been bothered by emotional problems such as feeling anxious, depressed, or irritable? Sometimes   In the past 7 days, how would you rate your fatigue on average? Mild   In the past 7 days, how would you rate your pain on average, where 0 means no pain, and 10 means worst imaginable pain? 4   In general, would you say your health is: 3  "   In general, would you say your quality of life is: 4    In general, how would you rate your physical health? 2    In general, how would you rate your mental health, including your mood and your ability to think? 4    In general, how would you rate your satisfaction with your social activities and relationships? 4    In general, please rate how well you carry out your usual social activities and roles. (This includes activities at home, at work and in your community, and responsibilities as a parent, child, spouse, employee, friend, etc.) 4    To what extent are you able to carry out your everyday physical activities such as walking, climbing stairs, carrying groceries, or moving a chair? 3    In the past 7 days, how often have you been bothered by emotional problems such as feeling anxious, depressed, or irritable? 3    In the past 7 days, how would you rate your fatigue on average? 2    In the past 7 days, how would you rate your pain on average, where 0 means no pain, and 10 means worst imaginable pain? 4    Global Mental Health Score 15    Global Physical Health Score 12    PROMIS TOTAL - SUBSCORES 27        Patient-reported                Traci Orozco LPN

## 2024-11-15 ENCOUNTER — TELEPHONE (OUTPATIENT)
Dept: ORTHOPEDICS | Facility: CLINIC | Age: 64
End: 2024-11-15
Payer: COMMERCIAL

## 2024-11-15 DIAGNOSIS — E21.3 HYPERPARATHYROIDISM (H): Primary | ICD-10-CM

## 2024-11-16 ENCOUNTER — APPOINTMENT (OUTPATIENT)
Dept: LAB | Facility: CLINIC | Age: 64
End: 2024-11-16
Payer: COMMERCIAL

## 2024-11-16 LAB
CALCIUM 24H UR-MRATE: 0.11 G/SPEC (ref 0.1–0.3)
CALCIUM UR-MCNC: 6 MG/DL
COLLECT DURATION TIME UR: 24 H
SPECIMEN VOL UR: 1800 ML

## 2024-11-16 PROCEDURE — 99000 SPECIMEN HANDLING OFFICE-LAB: CPT | Performed by: PATHOLOGY

## 2024-11-16 PROCEDURE — 82340 ASSAY OF CALCIUM IN URINE: CPT | Performed by: FAMILY MEDICINE

## 2024-11-18 ENCOUNTER — HOSPITAL ENCOUNTER (OUTPATIENT)
Dept: NUCLEAR MEDICINE | Facility: HOSPITAL | Age: 64
Discharge: HOME | End: 2024-11-18
Attending: FAMILY MEDICINE
Payer: COMMERCIAL

## 2024-11-18 DIAGNOSIS — E21.3 HYPERPARATHYROIDISM (H): ICD-10-CM

## 2024-11-18 PROCEDURE — A9500 TC99M SESTAMIBI: HCPCS | Performed by: FAMILY MEDICINE

## 2024-11-18 PROCEDURE — 343N000001 HC RX 343 MED OP 636: Performed by: FAMILY MEDICINE

## 2024-11-18 PROCEDURE — 78071 PARATHYRD PLANAR W/WO SUBTRJ: CPT

## 2024-11-18 PROCEDURE — A9516 IODINE I-123 SOD IODIDE MIC: HCPCS | Performed by: FAMILY MEDICINE

## 2024-11-18 RX ADMIN — Medication 25.3 MILLICURIE: at 07:19

## 2024-11-18 RX ADMIN — Medication 253 MICROCURIE: at 07:44

## 2025-01-06 ENCOUNTER — ALLIED HEALTH/NURSE VISIT (OUTPATIENT)
Dept: FAMILY MEDICINE | Facility: CLINIC | Age: 65
End: 2025-01-06
Payer: COMMERCIAL

## 2025-01-06 DIAGNOSIS — Z23 ENCOUNTER FOR IMMUNIZATION: Primary | ICD-10-CM

## 2025-01-06 PROCEDURE — 90750 HZV VACC RECOMBINANT IM: CPT

## 2025-01-06 PROCEDURE — 90471 IMMUNIZATION ADMIN: CPT

## 2025-01-12 ENCOUNTER — HEALTH MAINTENANCE LETTER (OUTPATIENT)
Age: 65
End: 2025-01-12

## 2025-01-18 ENCOUNTER — MYC MEDICAL ADVICE (OUTPATIENT)
Dept: ORTHOPEDICS | Facility: CLINIC | Age: 65
End: 2025-01-18
Payer: COMMERCIAL

## 2025-01-20 DIAGNOSIS — M41.35 THORACOGENIC SCOLIOSIS OF THORACOLUMBAR REGION: Primary | ICD-10-CM

## 2025-01-22 NOTE — TELEPHONE ENCOUNTER
ATC spoke with Shantelle, all is good with the order.  The referral has been entered and patient is good to go.  ATC explained [place] is the only [facility] location that has pool therapy, and that is defaulted in the order for specialty services for PT.    No further action required.    Estrada Reese MS, ATC, LAT, ELLA  [facility]  Dr. Marcelo [place]

## 2025-01-22 NOTE — TELEPHONE ENCOUNTER
FYI - Status Update    Who is Calling: Sandy Vargas     Update: Sandy Vargas calling and requesting to have referral refaxed. Referral was re-faxed to Fabio Vargas at time of call. Shantelle stated that referral that was received did state  Specialty services: Pool Therapy ([place] only)  Please advise if this needs to be removed from referral for patient to be seen with Fabio Vargas.

## 2025-02-26 ENCOUNTER — DOCUMENTATION ONLY (OUTPATIENT)
Dept: ORTHOPEDICS | Facility: CLINIC | Age: 65
End: 2025-02-26
Payer: COMMERCIAL

## 2025-02-26 NOTE — PROGRESS NOTES
Received Completed forms Yes   Faxed Forms Faxed To: the [place]  Fax Number: [phone]   Sent to HIM (Date) 2/26/25

## 2025-04-30 ENCOUNTER — TRANSFERRED RECORDS (OUTPATIENT)
Dept: HEALTH INFORMATION MANAGEMENT | Facility: CLINIC | Age: 65
End: 2025-04-30
Payer: COMMERCIAL